# Patient Record
Sex: MALE | Race: ASIAN | Employment: FULL TIME | URBAN - METROPOLITAN AREA
[De-identification: names, ages, dates, MRNs, and addresses within clinical notes are randomized per-mention and may not be internally consistent; named-entity substitution may affect disease eponyms.]

---

## 2017-01-12 ENCOUNTER — TELEPHONE (OUTPATIENT)
Dept: FAMILY MEDICINE CLINIC | Facility: CLINIC | Age: 47
End: 2017-01-12

## 2017-02-21 ENCOUNTER — TELEPHONE (OUTPATIENT)
Dept: FAMILY MEDICINE CLINIC | Facility: CLINIC | Age: 47
End: 2017-02-21

## 2017-02-21 NOTE — TELEPHONE ENCOUNTER
Left message on answering machine for patient to return phone call. Patient is overdue for labs. Will postpone for 1 month. Asked patient to return phone call.

## 2017-03-06 ENCOUNTER — OFFICE VISIT (OUTPATIENT)
Dept: FAMILY MEDICINE CLINIC | Facility: CLINIC | Age: 47
End: 2017-03-06

## 2017-03-06 VITALS
WEIGHT: 202.25 LBS | RESPIRATION RATE: 16 BRPM | OXYGEN SATURATION: 98 % | SYSTOLIC BLOOD PRESSURE: 130 MMHG | TEMPERATURE: 99 F | HEIGHT: 71 IN | DIASTOLIC BLOOD PRESSURE: 82 MMHG | BODY MASS INDEX: 28.31 KG/M2 | HEART RATE: 107 BPM

## 2017-03-06 DIAGNOSIS — E11.9 TYPE 2 DIABETES MELLITUS WITHOUT COMPLICATION, UNSPECIFIED LONG TERM INSULIN USE STATUS: ICD-10-CM

## 2017-03-06 DIAGNOSIS — E78.2 MIXED HYPERLIPIDEMIA: ICD-10-CM

## 2017-03-06 DIAGNOSIS — L40.9 PSORIASIS OF SCALP: ICD-10-CM

## 2017-03-06 DIAGNOSIS — J45.20 MILD INTERMITTENT ASTHMA WITHOUT COMPLICATION: ICD-10-CM

## 2017-03-06 DIAGNOSIS — E78.00 ELEVATED CHOLESTEROL: ICD-10-CM

## 2017-03-06 DIAGNOSIS — Z00.00 HEALTHY ADULT ON ROUTINE PHYSICAL EXAMINATION: Primary | ICD-10-CM

## 2017-03-06 LAB
ALBUMIN SERPL-MCNC: 4.4 G/DL (ref 3.5–4.8)
ALP LIVER SERPL-CCNC: 89 U/L (ref 45–117)
ALT SERPL-CCNC: 63 U/L (ref 17–63)
AST SERPL-CCNC: 27 U/L (ref 15–41)
BILIRUB SERPL-MCNC: 0.7 MG/DL (ref 0.1–2)
BUN BLD-MCNC: 10 MG/DL (ref 8–20)
CALCIUM BLD-MCNC: 9 MG/DL (ref 8.3–10.3)
CHLORIDE: 100 MMOL/L (ref 101–111)
CHOLEST SMN-MCNC: 279 MG/DL (ref ?–200)
CO2: 27 MMOL/L (ref 22–32)
CREAT BLD-MCNC: 1.34 MG/DL (ref 0.7–1.3)
GLUCOSE BLD-MCNC: 154 MG/DL (ref 70–99)
HDLC SERPL-MCNC: 31 MG/DL (ref 45–?)
HDLC SERPL: 9 {RATIO} (ref ?–4.97)
LDLC SERPL CALC-MCNC: 205 MG/DL (ref ?–130)
M PROTEIN MFR SERPL ELPH: 7.9 G/DL (ref 6.1–8.3)
NONHDLC SERPL-MCNC: 248 MG/DL (ref ?–130)
POTASSIUM SERPL-SCNC: 3.6 MMOL/L (ref 3.6–5.1)
SODIUM SERPL-SCNC: 136 MMOL/L (ref 136–144)
TRIGLYCERIDES: 215 MG/DL (ref ?–150)
VLDL: 43 MG/DL (ref 5–40)

## 2017-03-06 PROCEDURE — 80053 COMPREHEN METABOLIC PANEL: CPT | Performed by: FAMILY MEDICINE

## 2017-03-06 PROCEDURE — 82570 ASSAY OF URINE CREATININE: CPT | Performed by: FAMILY MEDICINE

## 2017-03-06 PROCEDURE — 83036 HEMOGLOBIN GLYCOSYLATED A1C: CPT | Performed by: FAMILY MEDICINE

## 2017-03-06 PROCEDURE — 36415 COLL VENOUS BLD VENIPUNCTURE: CPT | Performed by: FAMILY MEDICINE

## 2017-03-06 PROCEDURE — 82043 UR ALBUMIN QUANTITATIVE: CPT | Performed by: FAMILY MEDICINE

## 2017-03-06 PROCEDURE — 80061 LIPID PANEL: CPT | Performed by: FAMILY MEDICINE

## 2017-03-06 PROCEDURE — 99396 PREV VISIT EST AGE 40-64: CPT | Performed by: FAMILY MEDICINE

## 2017-03-06 RX ORDER — GLIPIZIDE 5 MG/1
TABLET, FILM COATED, EXTENDED RELEASE ORAL
Qty: 90 TABLET | Refills: 3 | Status: SHIPPED | OUTPATIENT
Start: 2017-03-06 | End: 2018-04-09

## 2017-03-06 RX ORDER — ATORVASTATIN CALCIUM 40 MG/1
40 TABLET, FILM COATED ORAL NIGHTLY
Qty: 90 TABLET | Refills: 3 | Status: SHIPPED | OUTPATIENT
Start: 2017-03-06 | End: 2017-03-07

## 2017-03-06 RX ORDER — FLUOCINOLONE ACETONIDE 0.11 MG/ML
OIL TOPICAL
Qty: 118 ML | Refills: 0 | Status: SHIPPED | OUTPATIENT
Start: 2017-03-06 | End: 2018-10-08 | Stop reason: ALTCHOICE

## 2017-03-07 ENCOUNTER — TELEPHONE (OUTPATIENT)
Dept: FAMILY MEDICINE CLINIC | Facility: CLINIC | Age: 47
End: 2017-03-07

## 2017-03-07 DIAGNOSIS — E78.2 MIXED HYPERLIPIDEMIA: ICD-10-CM

## 2017-03-07 DIAGNOSIS — E11.65 TYPE 2 DIABETES MELLITUS WITH HYPERGLYCEMIA, WITHOUT LONG-TERM CURRENT USE OF INSULIN (HCC): Primary | ICD-10-CM

## 2017-03-07 LAB
CREAT UR-SCNC: 451 MG/DL
EST. AVERAGE GLUCOSE BLD GHB EST-MCNC: 206 MG/DL (ref 68–126)
HBA1C MFR BLD HPLC: 8.8 % (ref ?–5.7)
MICROALBUMIN UR-MCNC: 4.43 MG/DL
MICROALBUMIN/CREAT 24H UR-RTO: 9.8 UG/MG (ref ?–30)

## 2017-03-07 RX ORDER — ATORVASTATIN CALCIUM 80 MG/1
80 TABLET, FILM COATED ORAL NIGHTLY
Qty: 90 TABLET | Refills: 0 | Status: SHIPPED | OUTPATIENT
Start: 2017-03-07 | End: 2018-10-29

## 2017-03-07 NOTE — PROGRESS NOTES
Kaylah Diaz is a 51year old male who presents for a complete physical exam.   HPI:   Pt complains of nothing. Needs a form filled out ffor work. Diabetes: due for blood work. Checking 1 hr after eating, 140 max. Fasting 105.  Taking everything excep Prescriptions:  FLUOCINOLONE ACETONIDE SCALP 0.01 % External Oil Apply to scalp qd, leave on overnight or >4 hrs before washing Disp: 118 mL Rfl: 0   MetFORMIN HCl 1000 MG Oral Tab Take 1 tablet (1,000 mg total) by mouth 2 (two) times daily with meals.  Dis congestion, sinus pain or ST  LUNGS: denies shortness of breath with exertion  CARDIOVASCULAR: denies chest pain on exertion  GI: denies abdominal pain,denies heartburn, at times, h/o achalasia, worse when he eats too much.    : denies nocturia or changes fat diet, testicular self exam and prostate cancer screening. The patient indicates understanding of these issues and agrees to the plan. The patient is asked to return for CPX in 1 year.     1. Healthy adult on routine physical examination  Fasting blood

## 2017-03-07 NOTE — TELEPHONE ENCOUNTER
Attempted to call home and work numbners, no answer. Cell mail box full, could not leave message. Left message with wife to have pt call the office. Wife called back, Arin Muniz wanted us to speak with her instead since he is at work. Labs are back.  A1

## 2017-03-16 ENCOUNTER — MED REC SCAN ONLY (OUTPATIENT)
Dept: FAMILY MEDICINE CLINIC | Facility: CLINIC | Age: 47
End: 2017-03-16

## 2017-06-21 ENCOUNTER — PATIENT OUTREACH (OUTPATIENT)
Dept: FAMILY MEDICINE CLINIC | Facility: CLINIC | Age: 47
End: 2017-06-21

## 2017-07-11 ENCOUNTER — TELEPHONE (OUTPATIENT)
Dept: FAMILY MEDICINE CLINIC | Facility: CLINIC | Age: 47
End: 2017-07-11

## 2017-07-13 ENCOUNTER — TELEPHONE (OUTPATIENT)
Dept: FAMILY MEDICINE CLINIC | Facility: CLINIC | Age: 47
End: 2017-07-13

## 2017-07-18 NOTE — TELEPHONE ENCOUNTER
He has not had the exam , nor is it scheduled. He's aware of the need, states he will call to schedule.

## 2017-08-11 ENCOUNTER — TELEPHONE (OUTPATIENT)
Dept: FAMILY MEDICINE CLINIC | Facility: CLINIC | Age: 47
End: 2017-08-11

## 2017-12-27 ENCOUNTER — PATIENT OUTREACH (OUTPATIENT)
Dept: FAMILY MEDICINE CLINIC | Facility: CLINIC | Age: 47
End: 2017-12-27

## 2018-03-22 ENCOUNTER — PATIENT OUTREACH (OUTPATIENT)
Dept: FAMILY MEDICINE CLINIC | Facility: CLINIC | Age: 48
End: 2018-03-22

## 2018-03-22 DIAGNOSIS — E11.9 TYPE 2 DIABETES MELLITUS WITHOUT COMPLICATION (HCC): ICD-10-CM

## 2018-03-22 NOTE — TELEPHONE ENCOUNTER
Last refilled on 3/6/17 for # 180 with 3 refills  Last A1C labs 8.8 on 3/6/17. Last seen on 3/6/17. No future appointments. Thank you.

## 2018-03-23 NOTE — TELEPHONE ENCOUNTER
Left message on voicemail/answering machine for patient to call office  Please let patient know script has been sent to pharmacy. Patient also due for f/u with Dr Vijay Gonsales for DM.  (see also patient outreach 3/22/18)

## 2018-04-09 DIAGNOSIS — E11.9 TYPE 2 DIABETES MELLITUS WITHOUT COMPLICATION (HCC): ICD-10-CM

## 2018-04-09 RX ORDER — GLIPIZIDE 5 MG/1
TABLET, FILM COATED, EXTENDED RELEASE ORAL
Qty: 90 TABLET | Refills: 3 | Status: SHIPPED | OUTPATIENT
Start: 2018-04-09 | End: 2019-04-22

## 2018-05-31 ENCOUNTER — PATIENT OUTREACH (OUTPATIENT)
Dept: FAMILY MEDICINE CLINIC | Facility: CLINIC | Age: 48
End: 2018-05-31

## 2018-05-31 NOTE — PROGRESS NOTES
Patient due for diabetic eye exam.    Left message on voicemail/answering machine for patient to call office

## 2018-10-08 ENCOUNTER — OFFICE VISIT (OUTPATIENT)
Dept: FAMILY MEDICINE CLINIC | Facility: CLINIC | Age: 48
End: 2018-10-08
Payer: COMMERCIAL

## 2018-10-08 VITALS
BODY MASS INDEX: 27.77 KG/M2 | HEART RATE: 80 BPM | SYSTOLIC BLOOD PRESSURE: 138 MMHG | WEIGHT: 205 LBS | DIASTOLIC BLOOD PRESSURE: 88 MMHG | RESPIRATION RATE: 16 BRPM | TEMPERATURE: 98 F | HEIGHT: 72 IN

## 2018-10-08 DIAGNOSIS — R06.00 DYSPNEA ON EXERTION: ICD-10-CM

## 2018-10-08 DIAGNOSIS — E11.9 TYPE 2 DIABETES MELLITUS WITHOUT COMPLICATION, UNSPECIFIED WHETHER LONG TERM INSULIN USE (HCC): ICD-10-CM

## 2018-10-08 DIAGNOSIS — E78.00 ELEVATED CHOLESTEROL: Primary | ICD-10-CM

## 2018-10-08 PROCEDURE — 99214 OFFICE O/P EST MOD 30 MIN: CPT | Performed by: FAMILY MEDICINE

## 2018-10-08 PROCEDURE — 84443 ASSAY THYROID STIM HORMONE: CPT | Performed by: FAMILY MEDICINE

## 2018-10-08 PROCEDURE — 93000 ELECTROCARDIOGRAM COMPLETE: CPT | Performed by: FAMILY MEDICINE

## 2018-10-08 PROCEDURE — 85025 COMPLETE CBC W/AUTO DIFF WBC: CPT | Performed by: FAMILY MEDICINE

## 2018-10-08 PROCEDURE — 80053 COMPREHEN METABOLIC PANEL: CPT | Performed by: FAMILY MEDICINE

## 2018-10-08 PROCEDURE — 82570 ASSAY OF URINE CREATININE: CPT | Performed by: FAMILY MEDICINE

## 2018-10-08 PROCEDURE — 36415 COLL VENOUS BLD VENIPUNCTURE: CPT | Performed by: FAMILY MEDICINE

## 2018-10-08 PROCEDURE — 80061 LIPID PANEL: CPT | Performed by: FAMILY MEDICINE

## 2018-10-08 PROCEDURE — 82043 UR ALBUMIN QUANTITATIVE: CPT | Performed by: FAMILY MEDICINE

## 2018-10-08 PROCEDURE — 83036 HEMOGLOBIN GLYCOSYLATED A1C: CPT | Performed by: FAMILY MEDICINE

## 2018-10-08 NOTE — PROGRESS NOTES
HPI:   Janay Reyes is a 51year old male who presents for recheck of his diabetes. Patient’s FBS have been 150's, but not checking regularly. Last visit with ophthalmologist was 3-4 weeks ago at [facility] in [place].   Pt has been checking his feet 01/20/2016     Lab Results   Component Value Date    AST 27 03/06/2017    AST 12 (L) 01/20/2016     Lab Results   Component Value Date    ALT 63 03/06/2017    ALT 33 01/20/2016     Malb/Cre Calc   Date Value Ref Range Status   03/06/2017 9.8 <=30.0 ug/mg F lesions  NECK: supple,no adenopathy  LUNGS: clear to auscultation  CARDIO: RRR without murmur  GI: good BS's,no masses, HSM or tenderness  EXTREMITIES: no cyanosis, clubbing or edema  Bilateral barefoot skin diabetic exam is normal, visualized feet and the

## 2018-10-09 ENCOUNTER — TELEPHONE (OUTPATIENT)
Dept: FAMILY MEDICINE CLINIC | Facility: CLINIC | Age: 48
End: 2018-10-09

## 2018-10-09 NOTE — TELEPHONE ENCOUNTER
Diabetic eye exam done at [facility] by Dr Erika Chambers O.D.   Exam done 9/19/18  No diabetic retinopathy    flowsheet updated  Report sent to scanning

## 2018-10-15 ENCOUNTER — TELEPHONE (OUTPATIENT)
Dept: FAMILY MEDICINE CLINIC | Facility: CLINIC | Age: 48
End: 2018-10-15

## 2018-10-15 DIAGNOSIS — E11.9 TYPE 2 DIABETES MELLITUS WITHOUT COMPLICATION, UNSPECIFIED WHETHER LONG TERM INSULIN USE (HCC): Primary | ICD-10-CM

## 2018-10-15 DIAGNOSIS — E78.00 ELEVATED CHOLESTEROL: ICD-10-CM

## 2018-10-15 RX ORDER — BLOOD SUGAR DIAGNOSTIC
STRIP MISCELLANEOUS
Qty: 300 STRIP | Refills: 3 | Status: SHIPPED | OUTPATIENT
Start: 2018-10-15 | End: 2019-10-15

## 2018-10-15 NOTE — TELEPHONE ENCOUNTER
Pt returned call, discussed results. Restart statin, he has some at home already, will start taking that again. Continue metformin and glipizide but add invokana 100 mg per day, will increase after a month if tolerated to 300 mg per day.    Follow up fo

## 2018-10-29 ENCOUNTER — TELEPHONE (OUTPATIENT)
Dept: FAMILY MEDICINE CLINIC | Facility: CLINIC | Age: 48
End: 2018-10-29

## 2018-10-29 DIAGNOSIS — E11.65 TYPE 2 DIABETES MELLITUS WITH HYPERGLYCEMIA, WITHOUT LONG-TERM CURRENT USE OF INSULIN (HCC): ICD-10-CM

## 2018-10-29 DIAGNOSIS — E78.2 MIXED HYPERLIPIDEMIA: ICD-10-CM

## 2018-10-29 DIAGNOSIS — E78.00 ELEVATED CHOLESTEROL: Primary | ICD-10-CM

## 2018-10-29 RX ORDER — ATORVASTATIN CALCIUM 40 MG/1
40 TABLET, FILM COATED ORAL NIGHTLY
Qty: 90 TABLET | Refills: 3 | Status: SHIPPED | OUTPATIENT
Start: 2018-10-29 | End: 2019-11-17

## 2018-10-29 RX ORDER — ATORVASTATIN CALCIUM 80 MG/1
80 TABLET, FILM COATED ORAL NIGHTLY
Qty: 90 TABLET | Refills: 3 | Status: SHIPPED | OUTPATIENT
Start: 2018-10-29 | End: 2018-10-29 | Stop reason: DRUGHIGH

## 2018-10-29 NOTE — TELEPHONE ENCOUNTER
Pt needs refill atorvastatin, 40 mg, once daily.  (script ) Needs to got [address], [employer] (not [place])

## 2018-10-29 NOTE — TELEPHONE ENCOUNTER
Patient wife calling. States patient last took atorvastatin 40 mg and refill was sent for 80 mg. Wants to know if patient should be taking that dose of atorvastatin    Per epic, atorvastatin 40 mg was prescribed last 7-.   Dose was increased to 80 mg

## 2018-10-29 NOTE — TELEPHONE ENCOUNTER
Last refilled on 3/7/17 for # 90 with 0 refills  Last lipid 10/8/18  Last seen on 10/8/18  Future Appointments   Date Time Provider José Luis Connelly   10/30/2018  2:00 PM Wandy Walker [facility] MICKIE Bowen   11/14/2018  2:45 PM YK CARD TM/STRESS/ECHO

## 2018-10-29 NOTE — TELEPHONE ENCOUNTER
Script sent for 40 mg dose. I think before, I assumed he's been taking it and increased the dose when his numbers were terrible. Lets get him back on the 40 mg dose and see how his lipids look. Thanks.

## 2018-10-30 ENCOUNTER — OFFICE VISIT (OUTPATIENT)
Dept: FAMILY MEDICINE CLINIC | Facility: CLINIC | Age: 48
End: 2018-10-30
Payer: COMMERCIAL

## 2018-10-30 VITALS
HEIGHT: 72 IN | RESPIRATION RATE: 20 BRPM | SYSTOLIC BLOOD PRESSURE: 124 MMHG | DIASTOLIC BLOOD PRESSURE: 60 MMHG | TEMPERATURE: 98 F | WEIGHT: 195 LBS | HEART RATE: 60 BPM | BODY MASS INDEX: 26.41 KG/M2

## 2018-10-30 DIAGNOSIS — I21.19 ACUTE ST ELEVATION MYOCARDIAL INFARCTION (STEMI) INVOLVING OTHER CORONARY ARTERY OF INFERIOR WALL (HCC): Primary | ICD-10-CM

## 2018-10-30 DIAGNOSIS — R11.0 NAUSEA: ICD-10-CM

## 2018-10-30 DIAGNOSIS — Z95.1 S/P CABG X 5: ICD-10-CM

## 2018-10-30 DIAGNOSIS — E11.9 TYPE 2 DIABETES MELLITUS WITHOUT COMPLICATION, WITHOUT LONG-TERM CURRENT USE OF INSULIN (HCC): ICD-10-CM

## 2018-10-30 DIAGNOSIS — I25.10 CORONARY ARTERY DISEASE INVOLVING NATIVE CORONARY ARTERY OF NATIVE HEART WITHOUT ANGINA PECTORIS: ICD-10-CM

## 2018-10-30 PROBLEM — I21.3 ACUTE ST ELEVATION MYOCARDIAL INFARCTION (STEMI) (HCC): Status: ACTIVE | Noted: 2018-10-30

## 2018-10-30 PROBLEM — G47.33 OSA ON CPAP: Status: ACTIVE | Noted: 2018-10-30

## 2018-10-30 PROBLEM — Z99.89 OSA ON CPAP: Status: ACTIVE | Noted: 2018-10-30

## 2018-10-30 PROCEDURE — 99214 OFFICE O/P EST MOD 30 MIN: CPT | Performed by: FAMILY MEDICINE

## 2018-10-30 RX ORDER — HYDROCODONE BITARTRATE AND ACETAMINOPHEN 5; 325 MG/1; MG/1
TABLET ORAL
Refills: 0 | COMMUNITY
Start: 2018-10-23 | End: 2018-11-01

## 2018-10-30 RX ORDER — ASPIRIN 81 MG
TABLET, DELAYED RELEASE (ENTERIC COATED) ORAL
Refills: 0 | COMMUNITY
Start: 2018-10-23 | End: 2019-02-19 | Stop reason: ALTCHOICE

## 2018-10-30 RX ORDER — IBUPROFEN 600 MG/1
600 TABLET ORAL
COMMUNITY
End: 2019-08-19 | Stop reason: ALTCHOICE

## 2018-10-30 RX ORDER — FUROSEMIDE 20 MG/1
TABLET ORAL
Refills: 0 | COMMUNITY
Start: 2018-10-23 | End: 2019-02-19 | Stop reason: ALTCHOICE

## 2018-10-30 RX ORDER — POTASSIUM CHLORIDE 20 MEQ/1
TABLET, EXTENDED RELEASE ORAL
Refills: 0 | COMMUNITY
Start: 2018-10-23 | End: 2019-02-19 | Stop reason: ALTCHOICE

## 2018-10-30 RX ORDER — AMIODARONE HYDROCHLORIDE 200 MG/1
TABLET ORAL
Refills: 0 | COMMUNITY
Start: 2018-10-23 | End: 2019-08-19 | Stop reason: ALTCHOICE

## 2018-10-30 RX ORDER — ONDANSETRON 8 MG/1
8 TABLET, ORALLY DISINTEGRATING ORAL EVERY 8 HOURS PRN
Qty: 15 TABLET | Refills: 0 | Status: SHIPPED | OUTPATIENT
Start: 2018-10-30 | End: 2018-11-17

## 2018-10-30 RX ORDER — OMEPRAZOLE 40 MG/1
CAPSULE, DELAYED RELEASE ORAL
COMMUNITY
Start: 2012-02-28 | End: 2019-08-19 | Stop reason: ALTCHOICE

## 2018-10-30 RX ORDER — ASPIRIN 325 MG
325 TABLET ORAL
COMMUNITY

## 2018-10-30 RX ORDER — LISINOPRIL 5 MG/1
TABLET ORAL
Refills: 0 | COMMUNITY
Start: 2018-10-24 | End: 2019-02-19 | Stop reason: ALTCHOICE

## 2018-10-30 RX ORDER — ACETAMINOPHEN 325 MG/1
650 TABLET ORAL
COMMUNITY
End: 2019-02-19 | Stop reason: ALTCHOICE

## 2018-10-30 NOTE — PROGRESS NOTES
Wero Crowder is a 51year old male. Patient presents with:  Procedure Follow Up: from open heart surgery at [facility]      HPI:   [address] home a week ago s/p CABG x 5. Was in the ER on 10/16/18.  He woke up that AM with sharp chest pain that was differ MG Oral Capsule Delayed Release Take by mouth. Disp:  Rfl:    ondansetron 8 MG Oral Tablet Dispersible Take 1 tablet (8 mg total) by mouth every 8 (eight) hours as needed for Nausea.  Disp: 15 tablet Rfl: 0   atorvastatin 40 MG Oral Tab Take 1 tablet (40 mg Use      Smoking status: Never Smoker      Smokeless tobacco: Never Used    Alcohol use: No      Alcohol/week: 0.0 oz      Comment: occ    Drug use: No       BP Readings from Last 6 Encounters:  10/30/18 : 124/60  10/08/18 : 138/88  03/06/17 : 130/82  12/1 (STEMI) involving other coronary artery of inferior wall (HCC)    Coronary artery disease involving native coronary artery of native heart without angina pectoris    S/P CABG x 5  - follow up with cardiology as scheduled.    - advised to do cardiac rehab wh

## 2018-10-31 ENCOUNTER — TELEPHONE (OUTPATIENT)
Dept: FAMILY MEDICINE CLINIC | Facility: CLINIC | Age: 48
End: 2018-10-31

## 2018-10-31 DIAGNOSIS — Z95.1 S/P CABG X 5: Primary | ICD-10-CM

## 2018-10-31 NOTE — TELEPHONE ENCOUNTER
Pt wants to know if he can get a Refill of the   HYDROcodone-acetaminophen 5-325 MG Oral Tab  He has 4 more left to get his through today, Not sure if he will be ok through the night.      Please return call to [phone]

## 2018-10-31 NOTE — TELEPHONE ENCOUNTER
Wife advised of the information. She would like a few tablets until they can reach the surgeon.  Wife advised she will be contacted once the prescription is ready for

## 2018-10-31 NOTE — TELEPHONE ENCOUNTER
He really needs to be getting that medication from his surgeon so he can be monitoring the pain. Please have them call the surgeon as well.  I can't give them any tonight since I'm not in the office, but I can give them a few tomorrow until they can get a h

## 2018-10-31 NOTE — TELEPHONE ENCOUNTER
Wife called back the office. She states that the original prescription was from when he was released from the hospital. Patient did not mention it yesterday that he would need a refill. He did not release that he was almost out of medication.    Original pr

## 2018-11-01 ENCOUNTER — TELEPHONE (OUTPATIENT)
Dept: FAMILY MEDICINE CLINIC | Facility: CLINIC | Age: 48
End: 2018-11-01

## 2018-11-01 RX ORDER — HYDROCODONE BITARTRATE AND ACETAMINOPHEN 5; 325 MG/1; MG/1
TABLET ORAL
Qty: 10 TABLET | Refills: 0 | Status: SHIPPED | OUTPATIENT
Start: 2018-11-01 | End: 2019-02-19 | Stop reason: ALTCHOICE

## 2018-11-01 NOTE — TELEPHONE ENCOUNTER
Patient wife notified FMLA forms where faxed to [facility].   Copy placed at  per patient wife request.

## 2018-11-01 NOTE — TELEPHONE ENCOUNTER
Spouse called, I told her Dr. Ireland Kinds note from this morning. She has already called the surgeons office this morning. Spouse will come and  script for pt.

## 2018-11-01 NOTE — TELEPHONE ENCOUNTER
Script printed for #10, please advise pt to call surgeon's office today in hopes to get a script by the weekend.

## 2018-11-02 ENCOUNTER — MED REC SCAN ONLY (OUTPATIENT)
Dept: FAMILY MEDICINE CLINIC | Facility: CLINIC | Age: 48
End: 2018-11-02

## 2018-11-07 ENCOUNTER — TELEPHONE (OUTPATIENT)
Dept: FAMILY MEDICINE CLINIC | Facility: CLINIC | Age: 48
End: 2018-11-07

## 2018-11-07 NOTE — TELEPHONE ENCOUNTER
Dr. Eryn Fatima, pt has a overdue test, does patient still need to complete or can we cancel?      Lab Frequency Next Occurrence   CARD ECHO STRESS ECHO/REST AND STRESS(CPT=93350/309[address] [facility] [place] [address]) Once 10/08/2018

## 2018-11-17 DIAGNOSIS — R11.0 NAUSEA: ICD-10-CM

## 2018-11-17 RX ORDER — ONDANSETRON 8 MG/1
8 TABLET, ORALLY DISINTEGRATING ORAL EVERY 8 HOURS PRN
Qty: 30 TABLET | Refills: 0 | Status: SHIPPED | OUTPATIENT
Start: 2018-11-17 | End: 2019-02-19 | Stop reason: ALTCHOICE

## 2018-11-20 ENCOUNTER — TELEPHONE (OUTPATIENT)
Dept: FAMILY MEDICINE CLINIC | Facility: CLINIC | Age: 48
End: 2018-11-20

## 2018-11-20 NOTE — TELEPHONE ENCOUNTER
[address] CALLED AND NEEDS A VERBAL FROM DR TO DISCHARGES HOME HEALTH ORDERS.     WAS ADV PT IS NOT HOME BOUND

## 2018-11-20 NOTE — TELEPHONE ENCOUNTER
BOLIVAR FROM [facility] CALLED BACK-RELAYED PROVIDING INFORMATION THAT DR WILLSON PROVIDED.     ADV TO CONTACT SURGEON    BOLIVAR V/U

## 2018-11-20 NOTE — TELEPHONE ENCOUNTER
Per MARTITA, surgeon ordered home health. Will need to contact the ordering provider to discharge. Voicemail did not state name of who I was calling. Left message that I was returning a call to [facility].   No patient info provided in message

## 2018-12-14 ENCOUNTER — OFFICE VISIT (OUTPATIENT)
Dept: FAMILY MEDICINE CLINIC | Facility: CLINIC | Age: 48
End: 2018-12-14
Payer: COMMERCIAL

## 2018-12-14 VITALS
TEMPERATURE: 97 F | BODY MASS INDEX: 26.66 KG/M2 | RESPIRATION RATE: 16 BRPM | DIASTOLIC BLOOD PRESSURE: 84 MMHG | WEIGHT: 199 LBS | SYSTOLIC BLOOD PRESSURE: 124 MMHG | HEIGHT: 72.5 IN | HEART RATE: 64 BPM

## 2018-12-14 DIAGNOSIS — E11.9 TYPE 2 DIABETES MELLITUS WITHOUT COMPLICATION, WITHOUT LONG-TERM CURRENT USE OF INSULIN (HCC): ICD-10-CM

## 2018-12-14 DIAGNOSIS — Z95.1 S/P CABG X 5: Primary | ICD-10-CM

## 2018-12-14 DIAGNOSIS — T81.89XA DELAYED SURGICAL WOUND HEALING, INITIAL ENCOUNTER: ICD-10-CM

## 2018-12-14 DIAGNOSIS — L03.116 CELLULITIS OF LEFT LOWER EXTREMITY: ICD-10-CM

## 2018-12-14 PROCEDURE — 99214 OFFICE O/P EST MOD 30 MIN: CPT | Performed by: FAMILY MEDICINE

## 2018-12-14 NOTE — PROGRESS NOTES
John Morrell is a 51year old male. Patient presents with: Follow - Up: F/U on open heart surgery. needs paper to exend leave. HPI:   Was still having discharge from the wound this week. Has cleard up some.  Saw Dr. Sanna Rivera yesterday and he thoug Rfl: 0   Potassium Chloride ER 20 MEQ Oral Tab CR TK 1 T PO QD Disp:  Rfl: 0   Omeprazole 40 MG Oral Capsule Delayed Release Take by mouth.  Disp:  Rfl:    SENNA PLUS 8.6-50 MG Oral Tab TK 1 T PO BID Disp:  Rfl: 0   atorvastatin 40 MG Oral Tab Take 1 tablet CARDIOVASCULAR: chest pain from sternotomy, no palpitations or dizziness   GI: denies abdominal pain and denies heartburn  NEURO: denies headaches or dizziness     EXAM:   /84 (BP Location: Left arm, Patient Position: Sitting, Cuff Size: adult)   P in this encounter. Meds & Refills for this Visit:  Requested Prescriptions      No prescriptions requested or ordered in this encounter             The patient indicates understanding of these issues and agrees to the plan.

## 2018-12-20 ENCOUNTER — TELEPHONE (OUTPATIENT)
Dept: FAMILY MEDICINE CLINIC | Facility: CLINIC | Age: 48
End: 2018-12-20

## 2018-12-20 ENCOUNTER — MED REC SCAN ONLY (OUTPATIENT)
Dept: FAMILY MEDICINE CLINIC | Facility: CLINIC | Age: 48
End: 2018-12-20

## 2019-01-09 ENCOUNTER — TELEPHONE (OUTPATIENT)
Dept: FAMILY MEDICINE CLINIC | Facility: CLINIC | Age: 49
End: 2019-01-09

## 2019-01-09 NOTE — TELEPHONE ENCOUNTER
Pt's wife would like to nurse. Didn't want to give any details.    Please return call to [phone]

## 2019-01-09 NOTE — TELEPHONE ENCOUNTER
Call from patient's wife. [facility] [place] [facility] had MI and heart surgery in October 2018. [facility] [place] [facility] patient has had his FMLA extended due to some complications that he had, and is still at home.  Wife Onur Masterson states that she has been his caregiver 24/7 during this time

## 2019-01-15 ENCOUNTER — TELEPHONE (OUTPATIENT)
Dept: FAMILY MEDICINE CLINIC | Facility: CLINIC | Age: 49
End: 2019-01-15

## 2019-01-15 NOTE — TELEPHONE ENCOUNTER
Letter mailed to patient reminding him he is due for labs.     Lab Frequency Next Occurrence   HEMOGLOBIN A1C Once 01/15/2019   LIPID PANEL Once 01/15/2019

## 2019-01-28 ENCOUNTER — TELEPHONE (OUTPATIENT)
Dept: FAMILY MEDICINE CLINIC | Facility: CLINIC | Age: 49
End: 2019-01-28

## 2019-01-28 NOTE — TELEPHONE ENCOUNTER
Received notes from [facility] cardio/pulm rehab. Dr Juan Cody review and faxed back to [place] at [phone]. Copy sent to scanning.

## 2019-02-05 ENCOUNTER — NURSE ONLY (OUTPATIENT)
Dept: FAMILY MEDICINE CLINIC | Facility: CLINIC | Age: 49
End: 2019-02-05
Payer: COMMERCIAL

## 2019-02-05 DIAGNOSIS — E78.00 ELEVATED CHOLESTEROL: ICD-10-CM

## 2019-02-05 DIAGNOSIS — E11.9 TYPE 2 DIABETES MELLITUS WITHOUT COMPLICATION, UNSPECIFIED WHETHER LONG TERM INSULIN USE (HCC): ICD-10-CM

## 2019-02-05 LAB
CHOLEST SMN-MCNC: 165 MG/DL (ref ?–200)
EST. AVERAGE GLUCOSE BLD GHB EST-MCNC: 151 MG/DL (ref 68–126)
HBA1C MFR BLD HPLC: 6.9 % (ref ?–5.7)
HDLC SERPL-MCNC: 31 MG/DL (ref 40–59)
LDLC SERPL CALC-MCNC: 114 MG/DL (ref ?–100)
NONHDLC SERPL-MCNC: 134 MG/DL (ref ?–130)
TRIGL SERPL-MCNC: 98 MG/DL (ref 30–149)
VLDLC SERPL CALC-MCNC: 20 MG/DL (ref 0–30)

## 2019-02-05 PROCEDURE — 36415 COLL VENOUS BLD VENIPUNCTURE: CPT | Performed by: FAMILY MEDICINE

## 2019-02-05 PROCEDURE — 80061 LIPID PANEL: CPT | Performed by: FAMILY MEDICINE

## 2019-02-05 PROCEDURE — 83036 HEMOGLOBIN GLYCOSYLATED A1C: CPT | Performed by: FAMILY MEDICINE

## 2019-02-06 ENCOUNTER — TELEPHONE (OUTPATIENT)
Dept: FAMILY MEDICINE CLINIC | Facility: CLINIC | Age: 49
End: 2019-02-06

## 2019-02-06 DIAGNOSIS — E11.9 TYPE 2 DIABETES MELLITUS WITHOUT COMPLICATION, WITHOUT LONG-TERM CURRENT USE OF INSULIN (HCC): Primary | ICD-10-CM

## 2019-02-06 NOTE — TELEPHONE ENCOUNTER
Great, I'm glad he is doing well. I do need to see him in the office for a check-in before clearing him to go back to work. I'm assuming he'll need a letter from me to go back to work.

## 2019-02-06 NOTE — TELEPHONE ENCOUNTER
Pt was advised of results- verbalized understanding    Has been in rehab for 4 weeks and is going well- slated to go back off disability on 2/25    Future orders placed with recall

## 2019-02-06 NOTE — TELEPHONE ENCOUNTER
----- Message from Gautam Lin DO sent at 2/6/2019 12:40 PM CST -----  Pls let pt know that his A1c is better than it's been in the past couple of years. We are down to 6.9, which is great. Cholesterol is also MUCH better than it was.  Keep up with curr

## 2019-02-15 ENCOUNTER — TELEPHONE (OUTPATIENT)
Dept: FAMILY MEDICINE CLINIC | Facility: CLINIC | Age: 49
End: 2019-02-15

## 2019-02-15 NOTE — TELEPHONE ENCOUNTER
Disability paperwork rec'd. I need to see him in the office to clear him to go back to work. Can you schedule him to see me next week so we can get him back the following week as he had said he was going to do.

## 2019-02-19 ENCOUNTER — OFFICE VISIT (OUTPATIENT)
Dept: FAMILY MEDICINE CLINIC | Facility: CLINIC | Age: 49
End: 2019-02-19
Payer: COMMERCIAL

## 2019-02-19 VITALS
TEMPERATURE: 97 F | RESPIRATION RATE: 16 BRPM | SYSTOLIC BLOOD PRESSURE: 120 MMHG | BODY MASS INDEX: 27.17 KG/M2 | WEIGHT: 205 LBS | HEIGHT: 73 IN | HEART RATE: 62 BPM | DIASTOLIC BLOOD PRESSURE: 90 MMHG

## 2019-02-19 DIAGNOSIS — I21.19 ACUTE ST ELEVATION MYOCARDIAL INFARCTION (STEMI) INVOLVING OTHER CORONARY ARTERY OF INFERIOR WALL (HCC): Primary | ICD-10-CM

## 2019-02-19 DIAGNOSIS — E11.9 TYPE 2 DIABETES MELLITUS WITHOUT COMPLICATION, WITHOUT LONG-TERM CURRENT USE OF INSULIN (HCC): ICD-10-CM

## 2019-02-19 DIAGNOSIS — Z99.89 OSA ON CPAP: ICD-10-CM

## 2019-02-19 DIAGNOSIS — Z95.1 S/P CABG X 5: ICD-10-CM

## 2019-02-19 DIAGNOSIS — G47.33 OSA ON CPAP: ICD-10-CM

## 2019-02-19 DIAGNOSIS — E78.00 ELEVATED CHOLESTEROL: ICD-10-CM

## 2019-02-19 PROCEDURE — 99214 OFFICE O/P EST MOD 30 MIN: CPT | Performed by: FAMILY MEDICINE

## 2019-02-19 RX ORDER — LOSARTAN POTASSIUM 25 MG/1
TABLET ORAL
Refills: 3 | COMMUNITY
Start: 2019-02-02

## 2019-02-19 NOTE — PROGRESS NOTES
Michelle Jeffrey is a 51year old male. Patient presents with: Follow - Up: needs clearance to go back to work      HPI:   Here to be cleared to go back to work. 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  He has felt better being off and away from t Essential hypertension       Social History:  Social History    Tobacco Use      Smoking status: Never Smoker      Smokeless tobacco: Never Used    Alcohol use: No      Alcohol/week: 0.0 oz      Comment: occ    Drug use: No       BP Readings from Last 6 En orders for this visit:    Acute ST elevation myocardial infarction (STEMI) involving other coronary artery of inferior wall [facility])  - doing well with cardiac rehab, which is increasing his confidence about going back to work.    - he feels ready to go back t

## 2019-02-25 ENCOUNTER — TELEPHONE (OUTPATIENT)
Dept: FAMILY MEDICINE CLINIC | Facility: CLINIC | Age: 49
End: 2019-02-25

## 2019-02-25 NOTE — TELEPHONE ENCOUNTER
PT WOULD LIKE TO DISCUSS HIS CONDITION UPDATE.  PT MET WITH CARDIOLOGIST    PLEASE CALL     THANK YOU

## 2019-02-25 NOTE — TELEPHONE ENCOUNTER
Patient states he met with cardiology and was approved to return to work part time for the next 4 weeks. States that will not work financially for him and would like to return full time.  Advised patient Dr Elidia Lee will not go against cardiology recommendati

## 2019-02-26 ENCOUNTER — MED REC SCAN ONLY (OUTPATIENT)
Dept: FAMILY MEDICINE CLINIC | Facility: CLINIC | Age: 49
End: 2019-02-26

## 2019-02-26 NOTE — TELEPHONE ENCOUNTER
Patient notified forms have been filled out per cardiology recommendation for 4 hours per day x 5 days per week x 4 weeks. Patient verbalized understanding.

## 2019-04-03 DIAGNOSIS — E11.9 TYPE 2 DIABETES MELLITUS WITHOUT COMPLICATION (HCC): ICD-10-CM

## 2019-04-03 NOTE — TELEPHONE ENCOUNTER
Last refilled on 3/22/18 for # 180 with 3 refills  Last A1C 6.9 on 2/5/19  Last OV 2/19/19  No future appointments. Thank you.

## 2019-04-22 DIAGNOSIS — E11.9 TYPE 2 DIABETES MELLITUS WITHOUT COMPLICATION (HCC): ICD-10-CM

## 2019-04-22 RX ORDER — GLIPIZIDE 5 MG/1
TABLET, FILM COATED, EXTENDED RELEASE ORAL
Qty: 90 TABLET | Refills: 0 | Status: SHIPPED | OUTPATIENT
Start: 2019-04-22 | End: 2019-07-25

## 2019-04-22 NOTE — TELEPHONE ENCOUNTER
Last refilled on 4/9/18 for # 90 with 3 refills  Last A1C 6.9 on 2/5/19  Last OV 2/19/19  No future appointments. Thank you.

## 2019-05-11 ENCOUNTER — NURSE ONLY (OUTPATIENT)
Dept: FAMILY MEDICINE CLINIC | Facility: CLINIC | Age: 49
End: 2019-05-11
Payer: COMMERCIAL

## 2019-05-11 DIAGNOSIS — E11.9 TYPE 2 DIABETES MELLITUS WITHOUT COMPLICATION, WITHOUT LONG-TERM CURRENT USE OF INSULIN (HCC): ICD-10-CM

## 2019-05-11 PROCEDURE — 36415 COLL VENOUS BLD VENIPUNCTURE: CPT | Performed by: FAMILY MEDICINE

## 2019-05-11 PROCEDURE — 83036 HEMOGLOBIN GLYCOSYLATED A1C: CPT | Performed by: FAMILY MEDICINE

## 2019-05-11 NOTE — PROGRESS NOTES
Patient presented for lab work ordered by Dr. Eryn Fatima. One lavender tube drawn with a straight needle in left AC space x 1 successful attempt. Pt tolerated procedure well and left in stable condition.

## 2019-05-13 DIAGNOSIS — Z79.4 TYPE 2 DIABETES MELLITUS WITH MICROALBUMINURIA, WITH LONG-TERM CURRENT USE OF INSULIN (HCC): Primary | ICD-10-CM

## 2019-05-13 DIAGNOSIS — E11.29 TYPE 2 DIABETES MELLITUS WITH MICROALBUMINURIA, WITH LONG-TERM CURRENT USE OF INSULIN (HCC): Primary | ICD-10-CM

## 2019-05-13 DIAGNOSIS — R80.9 TYPE 2 DIABETES MELLITUS WITH MICROALBUMINURIA, WITH LONG-TERM CURRENT USE OF INSULIN (HCC): Primary | ICD-10-CM

## 2019-06-10 DIAGNOSIS — E11.9 TYPE 2 DIABETES MELLITUS WITHOUT COMPLICATION (HCC): ICD-10-CM

## 2019-07-25 DIAGNOSIS — E11.9 TYPE 2 DIABETES MELLITUS WITHOUT COMPLICATION (HCC): ICD-10-CM

## 2019-07-25 RX ORDER — GLIPIZIDE 5 MG/1
TABLET, FILM COATED, EXTENDED RELEASE ORAL
Qty: 90 TABLET | Refills: 0 | Status: SHIPPED | OUTPATIENT
Start: 2019-07-25 | End: 2019-10-03

## 2019-07-25 NOTE — TELEPHONE ENCOUNTER
Fax from [facility] requesting refill of Glipizide    Last OV 2/19/19  Last lab 5/11/19  Last refilled 4/22/19  #90  0 refills

## 2019-08-01 ENCOUNTER — TELEPHONE (OUTPATIENT)
Dept: FAMILY MEDICINE CLINIC | Facility: CLINIC | Age: 49
End: 2019-08-01

## 2019-08-01 DIAGNOSIS — M54.2 NECK PAIN: Primary | ICD-10-CM

## 2019-08-01 NOTE — TELEPHONE ENCOUNTER
He can see Carl Deter, or otherwise whoever is in-network for him. They should call insurance and see if someone is preferred.

## 2019-08-01 NOTE — TELEPHONE ENCOUNTER
Patient has been having neck pain due to computer.   Would like to see a chiropractor and is looking for recommendation

## 2019-08-03 DIAGNOSIS — E11.9 TYPE 2 DIABETES MELLITUS WITHOUT COMPLICATION (HCC): ICD-10-CM

## 2019-08-05 NOTE — TELEPHONE ENCOUNTER
Last refilled on 6/10/19 for # 120 with 0 refills  Last a1c 7.2 on 5/11/19  Last OV 2/19/19  Future Appointments   Date Time Provider José Luis Connelly   8/19/2019  6:00 PM Del Gil [facility] [place] 2[facility] MICKIE Houston        Thank you.

## 2019-08-19 ENCOUNTER — OFFICE VISIT (OUTPATIENT)
Dept: FAMILY MEDICINE CLINIC | Facility: CLINIC | Age: 49
End: 2019-08-19
Payer: COMMERCIAL

## 2019-08-19 VITALS
TEMPERATURE: 97 F | WEIGHT: 212 LBS | HEART RATE: 72 BPM | RESPIRATION RATE: 16 BRPM | HEIGHT: 71.25 IN | SYSTOLIC BLOOD PRESSURE: 94 MMHG | BODY MASS INDEX: 29.35 KG/M2 | DIASTOLIC BLOOD PRESSURE: 60 MMHG

## 2019-08-19 DIAGNOSIS — Z00.00 HEALTHY ADULT ON ROUTINE PHYSICAL EXAMINATION: ICD-10-CM

## 2019-08-19 DIAGNOSIS — R39.9 LOWER URINARY TRACT SYMPTOMS (LUTS): ICD-10-CM

## 2019-08-19 DIAGNOSIS — E11.9 TYPE 2 DIABETES MELLITUS WITHOUT COMPLICATION (HCC): ICD-10-CM

## 2019-08-19 DIAGNOSIS — E11.9 TYPE 2 DIABETES MELLITUS WITHOUT COMPLICATION, WITHOUT LONG-TERM CURRENT USE OF INSULIN (HCC): Primary | ICD-10-CM

## 2019-08-19 DIAGNOSIS — Z12.5 SCREENING FOR PROSTATE CANCER: ICD-10-CM

## 2019-08-19 PROBLEM — Z98.61 POST PTCA: Status: ACTIVE | Noted: 2018-11-16

## 2019-08-19 PROCEDURE — 99396 PREV VISIT EST AGE 40-64: CPT | Performed by: FAMILY MEDICINE

## 2019-08-19 NOTE — PROGRESS NOTES
Gentry Olsen is a 47year old male who presents for a complete physical exam.   HPI:   Pt complains of nothing new today. Has had a lot of stress at work lately. Cardio: doing well. Healing, doing some more activities.  Last visit changed some tablet Rfl: 0   glipiZIDE ER 5 MG Oral Tablet 24 Hr TAKE ONE TABLET BY MOUTH ONCE DAILY Disp: 90 tablet Rfl: 0   Losartan Potassium 25 MG Oral Tab TK 1 T PO D Disp:  Rfl: 3   aspirin 325 MG Oral Tab Take 325 mg by mouth.  Disp:  Rfl:    metoprolol Tartrate carb diet     REVIEW OF SYSTEMS:   GENERAL: feels well otherwise  SKIN: denies any unusual skin lesions  EYES:denies blurred vision or double vision  HEENT: denies nasal congestion, sinus pain or ST  LUNGS: denies shortness of breath with exertion  CARDIOV in 1 year or sooner if needed.     Type 2 diabetes mellitus without complication, without long-term current use of insulin (hcc)  (primary encounter diagnosis)  Healthy adult on routine physical examination  Screening for prostate cancer  Lower urinary trac

## 2019-09-18 ENCOUNTER — TELEPHONE (OUTPATIENT)
Dept: FAMILY MEDICINE CLINIC | Facility: CLINIC | Age: 49
End: 2019-09-18

## 2019-10-01 ENCOUNTER — TELEPHONE (OUTPATIENT)
Dept: FAMILY MEDICINE CLINIC | Facility: CLINIC | Age: 49
End: 2019-10-01

## 2019-10-01 DIAGNOSIS — E11.9 TYPE 2 DIABETES MELLITUS WITHOUT COMPLICATION, WITHOUT LONG-TERM CURRENT USE OF INSULIN (HCC): Primary | ICD-10-CM

## 2019-10-01 DIAGNOSIS — E11.9 TYPE 2 DIABETES MELLITUS WITHOUT COMPLICATION (HCC): ICD-10-CM

## 2019-10-01 NOTE — TELEPHONE ENCOUNTER
Notes recorded by Dawn Webb DO on 10/1/2019 at 4:10 PM CDT  Pls let pt know that his A1c is still a little high at 7.4. It is creeping up from last time. Cholesterol is a little higher than I like to see in a diabetic.  All other labs look good inclu

## 2019-10-03 RX ORDER — GLIPIZIDE 5 MG/1
5 TABLET, FILM COATED, EXTENDED RELEASE ORAL 2 TIMES DAILY
Qty: 180 TABLET | Refills: 0 | Status: SHIPPED | OUTPATIENT
Start: 2019-10-03 | End: 2020-02-18

## 2019-10-03 NOTE — TELEPHONE ENCOUNTER
Patient notified and verbalized understanding. Will f/u with cardiology    Requests script to [facility] in [place].   Recall in epic

## 2019-11-17 DIAGNOSIS — E78.00 ELEVATED CHOLESTEROL: ICD-10-CM

## 2019-11-18 RX ORDER — ATORVASTATIN CALCIUM 40 MG/1
TABLET, FILM COATED ORAL
Qty: 90 TABLET | Refills: 3 | Status: SHIPPED | OUTPATIENT
Start: 2019-11-18 | End: 2021-08-30

## 2019-11-18 NOTE — TELEPHONE ENCOUNTER
Last refilled on 10/29/18 for # 90 with 3 rf. Last labs 9/30/19. Last seen on 8/19/19. No future appointments. Thank you.

## 2019-12-08 DIAGNOSIS — E11.9 TYPE 2 DIABETES MELLITUS WITHOUT COMPLICATION (HCC): ICD-10-CM

## 2019-12-09 NOTE — TELEPHONE ENCOUNTER
Last refill: 8- #180 with 0 refills  Last Visit: 8-  Next Visit: No future appointments. Forward to Dr. Lisandro Panchal please advise on refills. Thanks.

## 2019-12-10 ENCOUNTER — MED REC SCAN ONLY (OUTPATIENT)
Dept: FAMILY MEDICINE CLINIC | Facility: CLINIC | Age: 49
End: 2019-12-10

## 2020-01-06 ENCOUNTER — TELEPHONE (OUTPATIENT)
Dept: FAMILY MEDICINE CLINIC | Facility: CLINIC | Age: 50
End: 2020-01-06

## 2020-01-06 NOTE — TELEPHONE ENCOUNTER
Pt is due for labs. Letter mailed to patient reminding him to have labs done at [facility]. Order sent with letter to patient.

## 2020-01-20 ENCOUNTER — TELEPHONE (OUTPATIENT)
Dept: FAMILY MEDICINE CLINIC | Facility: CLINIC | Age: 50
End: 2020-01-20

## 2020-02-08 ENCOUNTER — TELEPHONE (OUTPATIENT)
Dept: FAMILY MEDICINE CLINIC | Facility: CLINIC | Age: 50
End: 2020-02-08

## 2020-02-18 DIAGNOSIS — E11.9 TYPE 2 DIABETES MELLITUS WITHOUT COMPLICATION (HCC): ICD-10-CM

## 2020-02-18 RX ORDER — GLIPIZIDE 5 MG/1
TABLET, FILM COATED, EXTENDED RELEASE ORAL
Qty: 180 TABLET | Refills: 0 | Status: SHIPPED | OUTPATIENT
Start: 2020-02-18 | End: 2020-05-21

## 2020-02-18 NOTE — TELEPHONE ENCOUNTER
Diabetic Medication Protocol Failed2/18 3:33 AM   Appointment in past 6 or next 3 months    HgBA1C procedure resulted in past 6 months    Last HgBA1C < 7.5    Microalbumin procedure in past 12 months or taking ACE/ARB     Last OV 8/19/2019  Last labs 9/30/

## 2020-04-24 ENCOUNTER — TELEPHONE (OUTPATIENT)
Dept: FAMILY MEDICINE CLINIC | Facility: CLINIC | Age: 50
End: 2020-04-24

## 2020-04-24 NOTE — TELEPHONE ENCOUNTER
Please schedule a video/telephone visit for med f/u for DM and instruct her to have at least 5 days of fasting and 2 hour after largest meal of day blood sugars.   If she does not have a glucometer, please have her check with insurance to see what is covere

## 2020-05-09 DIAGNOSIS — E11.9 TYPE 2 DIABETES MELLITUS WITHOUT COMPLICATION (HCC): ICD-10-CM

## 2020-05-11 NOTE — TELEPHONE ENCOUNTER
Patient notified via detailed voicemail left at cell number (ok per  HIPAA consent)  Asked to call back to schedule appt

## 2020-05-11 NOTE — TELEPHONE ENCOUNTER
Diabetic Medication Protocol Failed5/9 3:33 AM   HgBA1C procedure resulted in past 6 months    Last HgBA1C < 7.5    Appointment in past 6 or next 3 months    Microalbumin procedure in past 12 months or taking ACE/ARB     Last refill - 2/18/20 - #180   Last

## 2020-05-11 NOTE — TELEPHONE ENCOUNTER
Please schedule video DM follow up. We can bring him in for labs later. I can send the script if he needs it before then.

## 2020-05-21 RX ORDER — GLIPIZIDE 5 MG/1
5 TABLET, FILM COATED, EXTENDED RELEASE ORAL 2 TIMES DAILY
Qty: 60 TABLET | Refills: 0 | Status: SHIPPED | OUTPATIENT
Start: 2020-05-21 | End: 2020-06-22

## 2020-06-02 DIAGNOSIS — E11.9 TYPE 2 DIABETES MELLITUS WITHOUT COMPLICATION (HCC): ICD-10-CM

## 2020-06-04 NOTE — TELEPHONE ENCOUNTER
Patient notified via detailed voicemail left at cell number (ok per  HIPAA consent)    Asked patient to call back to schedule in office appointment.  Med will be sent once scheduled

## 2020-06-05 NOTE — TELEPHONE ENCOUNTER
Can only come after 530 or on a Saturday.       Future Appointments   Date Time Provider José Luis Connelly   6/27/2020 10:30 AM DO SANTY Serrano      routed to  to refill

## 2020-06-22 DIAGNOSIS — E11.9 TYPE 2 DIABETES MELLITUS WITHOUT COMPLICATION (HCC): ICD-10-CM

## 2020-06-22 RX ORDER — GLIPIZIDE 5 MG/1
TABLET, FILM COATED, EXTENDED RELEASE ORAL
Qty: 60 TABLET | Refills: 0 | Status: SHIPPED | OUTPATIENT
Start: 2020-06-22 | End: 2020-10-05

## 2020-06-27 ENCOUNTER — OFFICE VISIT (OUTPATIENT)
Dept: FAMILY MEDICINE CLINIC | Facility: CLINIC | Age: 50
End: 2020-06-27
Payer: COMMERCIAL

## 2020-06-27 VITALS
BODY MASS INDEX: 29 KG/M2 | TEMPERATURE: 98 F | SYSTOLIC BLOOD PRESSURE: 110 MMHG | DIASTOLIC BLOOD PRESSURE: 80 MMHG | WEIGHT: 215.19 LBS | HEART RATE: 69 BPM

## 2020-06-27 DIAGNOSIS — E78.00 ELEVATED CHOLESTEROL: Primary | ICD-10-CM

## 2020-06-27 DIAGNOSIS — E11.9 TYPE 2 DIABETES MELLITUS WITHOUT COMPLICATION, WITHOUT LONG-TERM CURRENT USE OF INSULIN (HCC): ICD-10-CM

## 2020-06-27 DIAGNOSIS — I25.10 CORONARY ARTERY DISEASE INVOLVING NATIVE CORONARY ARTERY OF NATIVE HEART WITHOUT ANGINA PECTORIS: ICD-10-CM

## 2020-06-27 PROCEDURE — 99214 OFFICE O/P EST MOD 30 MIN: CPT | Performed by: FAMILY MEDICINE

## 2020-06-27 PROCEDURE — 80053 COMPREHEN METABOLIC PANEL: CPT | Performed by: FAMILY MEDICINE

## 2020-06-27 PROCEDURE — 85025 COMPLETE CBC W/AUTO DIFF WBC: CPT | Performed by: FAMILY MEDICINE

## 2020-06-27 PROCEDURE — 80061 LIPID PANEL: CPT | Performed by: FAMILY MEDICINE

## 2020-06-27 PROCEDURE — 83036 HEMOGLOBIN GLYCOSYLATED A1C: CPT | Performed by: FAMILY MEDICINE

## 2020-06-27 PROCEDURE — 82043 UR ALBUMIN QUANTITATIVE: CPT | Performed by: FAMILY MEDICINE

## 2020-06-27 PROCEDURE — 82570 ASSAY OF URINE CREATININE: CPT | Performed by: FAMILY MEDICINE

## 2020-06-27 NOTE — PROGRESS NOTES
HPI:   Winsome White is a 45year old male who presents for recheck of his diabetes. Patient’s FBS have been 130-150's. Last visit with ophthalmologist was at the end of last year. Pt has been checking his feet on a regular basis.  Pt denies any tingl 1 TABLET BY MOUTH TWICE DAILY 60 tablet 0   • METFORMIN HCL 1000 MG Oral Tab TAKE 1 TABLET BY MOUTH TWICE DAILY WITH FOOD 180 tablet 0   • ATORVASTATIN 40 MG Oral Tab TAKE 1 TABLET(40 MG) BY MOUTH EVERY NIGHT 90 tablet 3   • Losartan Potassium 25 MG Oral T lesions  NECK: supple,no adenopathy,no bruits  LUNGS: clear to auscultation  CARDIO: RRR without murmur  GI: good BS's,no masses, HSM or tenderness  EXTREMITIES: no cyanosis, clubbing or edema  Bilateral barefoot skin diabetic exam is normal, visualized fe

## 2020-06-29 ENCOUNTER — TELEPHONE (OUTPATIENT)
Dept: FAMILY MEDICINE CLINIC | Facility: CLINIC | Age: 50
End: 2020-06-29

## 2020-06-29 DIAGNOSIS — E11.9 TYPE 2 DIABETES MELLITUS WITHOUT COMPLICATION (HCC): ICD-10-CM

## 2020-06-29 RX ORDER — METOPROLOL SUCCINATE 25 MG/1
TABLET, EXTENDED RELEASE ORAL
COMMUNITY
Start: 2020-05-27 | End: 2020-10-05

## 2020-06-29 RX ORDER — GLIPIZIDE 10 MG/1
10 TABLET, FILM COATED, EXTENDED RELEASE ORAL 2 TIMES DAILY
Qty: 180 TABLET | Refills: 0 | Status: CANCELLED | OUTPATIENT
Start: 2020-06-29

## 2020-06-29 NOTE — TELEPHONE ENCOUNTER
You can let him know that his A1c is up to 8.2 and his cholesterol is higher than I would like to see in a diabetic, but I will defer to cardiology to adjust the cholesterol meds.    Other labs look fine including urine test for protein, blood cell counts a

## 2020-06-29 NOTE — TELEPHONE ENCOUNTER
Patient wife notified and verbalized understanding. a1c and lipid values provided    She will discuss with patient and call back if he is agreeable to increase dose of glipizide. (uses [facility] in [place])  Will contact cardiology regarding li

## 2020-07-06 ENCOUNTER — TELEPHONE (OUTPATIENT)
Dept: FAMILY MEDICINE CLINIC | Facility: CLINIC | Age: 50
End: 2020-07-06

## 2020-07-06 NOTE — TELEPHONE ENCOUNTER
METFORMIN HCL 1000 MG Oral Tab      Pt would like refill sent to   [facility] [address], [address] [address] AT [address], [phone], [phone]

## 2020-07-06 NOTE — TELEPHONE ENCOUNTER
----- Message from Cordelia Lozano sent at 7/6/2020  3:44 PM CDT -----  Pham Munoz returned call in regards to PT.  Please call back, thanks

## 2020-07-20 NOTE — TELEPHONE ENCOUNTER
Spoke with Nikolay Salas who states patient did not want to increase his glipizide. States he has some episodes of hypoglycemia on current dose and had concerns about increasing  States episodes are not frequent but has had a few.     Will work on diet and f/u

## 2020-08-05 ENCOUNTER — TELEPHONE (OUTPATIENT)
Dept: FAMILY MEDICINE CLINIC | Facility: CLINIC | Age: 50
End: 2020-08-05

## 2020-08-05 NOTE — TELEPHONE ENCOUNTER
Spouse called and wants to know if we changed the ATORVASTATIN 40 MG Oral Tab. They picked script up it was 80MG. Pt doesn't remember a change and asked spouse to call on it.

## 2020-08-05 NOTE — TELEPHONE ENCOUNTER
Phone call to wife - advised her of the information from 's note from June 29 2020. She is going to check the name on the bottle of medication and see who the prescription if from and call back the office.

## 2020-09-25 ENCOUNTER — TELEPHONE (OUTPATIENT)
Dept: FAMILY MEDICINE CLINIC | Facility: CLINIC | Age: 50
End: 2020-09-25

## 2020-10-05 DIAGNOSIS — E11.9 TYPE 2 DIABETES MELLITUS WITHOUT COMPLICATION (HCC): ICD-10-CM

## 2020-10-05 RX ORDER — GLIPIZIDE 5 MG/1
5 TABLET, FILM COATED, EXTENDED RELEASE ORAL 2 TIMES DAILY
Qty: 180 TABLET | Refills: 0 | Status: SHIPPED | OUTPATIENT
Start: 2020-10-05 | End: 2020-12-28

## 2020-10-05 RX ORDER — METOPROLOL SUCCINATE 25 MG/1
TABLET, EXTENDED RELEASE ORAL
Qty: 90 TABLET | Refills: 0 | Status: SHIPPED | OUTPATIENT
Start: 2020-10-05

## 2020-10-05 NOTE — TELEPHONE ENCOUNTER
Hypertension Medications Protocol Atwvgb27/05/2020 11:24 AM   CMP or BMP in past 12 months Protocol Details    Last serum creatinine< 2.0     Appointment in past 6 or next 3 months      Metoprolol only listed as historical med. Please advise.   Diabetic Med

## 2020-10-05 NOTE — TELEPHONE ENCOUNTER
Spouse called, pt needs refills on GLIPIZIDE ER 5 MG Oral Tablet 24 Hr and Metoprolol Succinate ER 25 MG Oral Tablet 24 Hr. [facility]- [facility].    Please call spouse at [phone]

## 2020-10-08 ENCOUNTER — APPOINTMENT (OUTPATIENT)
Dept: LAB | Facility: HOSPITAL | Age: 50
End: 2020-10-08
Attending: FAMILY MEDICINE
Payer: COMMERCIAL

## 2020-10-08 ENCOUNTER — TELEMEDICINE (OUTPATIENT)
Dept: FAMILY MEDICINE CLINIC | Facility: CLINIC | Age: 50
End: 2020-10-08
Payer: COMMERCIAL

## 2020-10-08 ENCOUNTER — TELEPHONE (OUTPATIENT)
Dept: FAMILY MEDICINE CLINIC | Facility: CLINIC | Age: 50
End: 2020-10-08

## 2020-10-08 DIAGNOSIS — B97.89 VIRAL RESPIRATORY ILLNESS: Primary | ICD-10-CM

## 2020-10-08 DIAGNOSIS — Z20.822 PERSON UNDER INVESTIGATION FOR COVID-19: ICD-10-CM

## 2020-10-08 DIAGNOSIS — J98.8 VIRAL RESPIRATORY ILLNESS: ICD-10-CM

## 2020-10-08 DIAGNOSIS — B97.89 VIRAL RESPIRATORY ILLNESS: ICD-10-CM

## 2020-10-08 DIAGNOSIS — J98.8 VIRAL RESPIRATORY ILLNESS: Primary | ICD-10-CM

## 2020-10-08 PROCEDURE — 99213 OFFICE O/P EST LOW 20 MIN: CPT | Performed by: FAMILY MEDICINE

## 2020-10-08 NOTE — PROGRESS NOTES
This is a telemedicine visit with live, interactive video and audio. Patient understands and accepts financial responsibility for any deductible, co-insurance and/or co-pays associated with this service.     SUBJECTIVE  44 yo M history inclusive of Al No      Alcohol/week: 0.0 standard drinks      Comment: occ    Drug use: No         Shellfish-Derived P*    NAUSEA ONLY, OTHER (SEE COMMENTS)    Comment:Itchy throat   Current Outpatient Medications   Medication Sig Dispense Refill   • Metoprolol Succinate

## 2020-10-08 NOTE — TELEPHONE ENCOUNTER
Mohan Muller verbally {consents to a Virtual/Telephone Check-In service on 10/08/20.   Patient understands and accepts financial responsibility for any deductible, co-insurance and/or co-pays associated with this service

## 2020-10-31 DIAGNOSIS — E11.9 TYPE 2 DIABETES MELLITUS WITHOUT COMPLICATION (HCC): ICD-10-CM

## 2020-11-02 DIAGNOSIS — E11.9 TYPE 2 DIABETES MELLITUS WITHOUT COMPLICATION (HCC): ICD-10-CM

## 2020-11-02 NOTE — TELEPHONE ENCOUNTER
Pt wife called to request the following medication:  METFORMIN HCL 1000 MG Oral Tab    [facility] [address], [address] [address] AT [address], [phone], [phone]    Thank you

## 2020-11-02 NOTE — TELEPHONE ENCOUNTER
Last OV 6/27/2020  Last labs 6/27/2020  A1c 8.2, microalbumin  Last refilled 6/5/2020  #180  0 refills     Does not meet protocol due to A1c

## 2020-11-02 NOTE — TELEPHONE ENCOUNTER
Patient notified and verbalized understanding.    States he is in the middle of changing insurance and will call back to schedule f/u

## 2020-12-28 DIAGNOSIS — E11.9 TYPE 2 DIABETES MELLITUS WITHOUT COMPLICATION (HCC): ICD-10-CM

## 2020-12-28 RX ORDER — GLIPIZIDE 5 MG/1
5 TABLET, FILM COATED, EXTENDED RELEASE ORAL 2 TIMES DAILY
Qty: 180 TABLET | Refills: 0 | Status: SHIPPED | OUTPATIENT
Start: 2020-12-28 | End: 2021-01-18

## 2020-12-28 NOTE — TELEPHONE ENCOUNTER
Patient wife notified and scheduled appt    Future Appointments   Date Time Provider José Luis Connelly   1/16/2021  9:15 AM Uma Gonzalez [facility] Cres[facility]io Ivan      refill sent

## 2020-12-28 NOTE — TELEPHONE ENCOUNTER
Last OV 6/27/2020  (telemed with MM 10/8/2020)  Last labs 6/27/2020  Last refilled 10/5/2020  #180  0 refills     Was advised via 11/2/2020 tel enc appt needed. No future appointments.

## 2021-01-16 ENCOUNTER — OFFICE VISIT (OUTPATIENT)
Dept: FAMILY MEDICINE CLINIC | Facility: CLINIC | Age: 51
End: 2021-01-16
Payer: COMMERCIAL

## 2021-01-16 VITALS
WEIGHT: 204.13 LBS | DIASTOLIC BLOOD PRESSURE: 74 MMHG | OXYGEN SATURATION: 99 % | TEMPERATURE: 97 F | RESPIRATION RATE: 20 BRPM | SYSTOLIC BLOOD PRESSURE: 118 MMHG | HEIGHT: 72 IN | BODY MASS INDEX: 27.65 KG/M2 | HEART RATE: 73 BPM

## 2021-01-16 DIAGNOSIS — Z95.1 S/P CABG X 5: ICD-10-CM

## 2021-01-16 DIAGNOSIS — E11.9 TYPE 2 DIABETES MELLITUS WITHOUT COMPLICATION, WITHOUT LONG-TERM CURRENT USE OF INSULIN (HCC): Primary | ICD-10-CM

## 2021-01-16 DIAGNOSIS — J45.909 UNCOMPLICATED ASTHMA, UNSPECIFIED ASTHMA SEVERITY, UNSPECIFIED WHETHER PERSISTENT: ICD-10-CM

## 2021-01-16 DIAGNOSIS — F41.1 GAD (GENERALIZED ANXIETY DISORDER): ICD-10-CM

## 2021-01-16 LAB
ALBUMIN SERPL-MCNC: 4 G/DL (ref 3.4–5)
ALBUMIN/GLOB SERPL: 1.2 {RATIO} (ref 1–2)
ALP LIVER SERPL-CCNC: 90 U/L
ALT SERPL-CCNC: 39 U/L
ANION GAP SERPL CALC-SCNC: 7 MMOL/L (ref 0–18)
AST SERPL-CCNC: 21 U/L (ref 15–37)
BASOPHILS # BLD AUTO: 0.07 X10(3) UL (ref 0–0.2)
BASOPHILS NFR BLD AUTO: 0.9 %
BILIRUB SERPL-MCNC: 0.8 MG/DL (ref 0.1–2)
BUN BLD-MCNC: 15 MG/DL (ref 7–18)
BUN/CREAT SERPL: 12.8 (ref 10–20)
CALCIUM BLD-MCNC: 8.9 MG/DL (ref 8.5–10.1)
CHLORIDE SERPL-SCNC: 104 MMOL/L (ref 98–112)
CHOLEST SMN-MCNC: 154 MG/DL (ref ?–200)
CO2 SERPL-SCNC: 25 MMOL/L (ref 21–32)
CREAT BLD-MCNC: 1.17 MG/DL
CREAT UR-SCNC: 284 MG/DL
DEPRECATED RDW RBC AUTO: 41.5 FL (ref 35.1–46.3)
EOSINOPHIL # BLD AUTO: 0.32 X10(3) UL (ref 0–0.7)
EOSINOPHIL NFR BLD AUTO: 4.2 %
ERYTHROCYTE [DISTWIDTH] IN BLOOD BY AUTOMATED COUNT: 13.2 % (ref 11–15)
EST. AVERAGE GLUCOSE BLD GHB EST-MCNC: 174 MG/DL (ref 68–126)
GLOBULIN PLAS-MCNC: 3.4 G/DL (ref 2.8–4.4)
GLUCOSE BLD-MCNC: 132 MG/DL (ref 70–99)
HBA1C MFR BLD HPLC: 7.7 % (ref ?–5.7)
HCT VFR BLD AUTO: 47.5 %
HDLC SERPL-MCNC: 25 MG/DL (ref 40–59)
HGB BLD-MCNC: 15.5 G/DL
IMM GRANULOCYTES # BLD AUTO: 0.01 X10(3) UL (ref 0–1)
IMM GRANULOCYTES NFR BLD: 0.1 %
LDLC SERPL CALC-MCNC: 114 MG/DL (ref ?–100)
LYMPHOCYTES # BLD AUTO: 2.26 X10(3) UL (ref 1–4)
LYMPHOCYTES NFR BLD AUTO: 29.4 %
M PROTEIN MFR SERPL ELPH: 7.4 G/DL (ref 6.4–8.2)
MCH RBC QN AUTO: 28.4 PG (ref 26–34)
MCHC RBC AUTO-ENTMCNC: 32.6 G/DL (ref 31–37)
MCV RBC AUTO: 87.2 FL
MICROALBUMIN UR-MCNC: 1.17 MG/DL
MICROALBUMIN/CREAT 24H UR-RTO: 4.1 UG/MG (ref ?–30)
MONOCYTES # BLD AUTO: 0.46 X10(3) UL (ref 0.1–1)
MONOCYTES NFR BLD AUTO: 6 %
NEUTROPHILS # BLD AUTO: 4.58 X10 (3) UL (ref 1.5–7.7)
NEUTROPHILS # BLD AUTO: 4.58 X10(3) UL (ref 1.5–7.7)
NEUTROPHILS NFR BLD AUTO: 59.4 %
NONHDLC SERPL-MCNC: 129 MG/DL (ref ?–130)
OSMOLALITY SERPL CALC.SUM OF ELEC: 285 MOSM/KG (ref 275–295)
PATIENT FASTING Y/N/NP: YES
PATIENT FASTING Y/N/NP: YES
PLATELET # BLD AUTO: 238 10(3)UL (ref 150–450)
POTASSIUM SERPL-SCNC: 4 MMOL/L (ref 3.5–5.1)
RBC # BLD AUTO: 5.45 X10(6)UL
SODIUM SERPL-SCNC: 136 MMOL/L (ref 136–145)
TRIGL SERPL-MCNC: 74 MG/DL (ref 30–149)
VLDLC SERPL CALC-MCNC: 15 MG/DL (ref 0–30)
WBC # BLD AUTO: 7.7 X10(3) UL (ref 4–11)

## 2021-01-16 PROCEDURE — 80053 COMPREHEN METABOLIC PANEL: CPT | Performed by: FAMILY MEDICINE

## 2021-01-16 PROCEDURE — 3074F SYST BP LT 130 MM HG: CPT | Performed by: FAMILY MEDICINE

## 2021-01-16 PROCEDURE — 83036 HEMOGLOBIN GLYCOSYLATED A1C: CPT | Performed by: FAMILY MEDICINE

## 2021-01-16 PROCEDURE — 82570 ASSAY OF URINE CREATININE: CPT | Performed by: FAMILY MEDICINE

## 2021-01-16 PROCEDURE — 99214 OFFICE O/P EST MOD 30 MIN: CPT | Performed by: FAMILY MEDICINE

## 2021-01-16 PROCEDURE — 82043 UR ALBUMIN QUANTITATIVE: CPT | Performed by: FAMILY MEDICINE

## 2021-01-16 PROCEDURE — 85025 COMPLETE CBC W/AUTO DIFF WBC: CPT | Performed by: FAMILY MEDICINE

## 2021-01-16 PROCEDURE — 3008F BODY MASS INDEX DOCD: CPT | Performed by: FAMILY MEDICINE

## 2021-01-16 PROCEDURE — 99000 SPECIMEN HANDLING OFFICE-LAB: CPT | Performed by: FAMILY MEDICINE

## 2021-01-16 PROCEDURE — 80061 LIPID PANEL: CPT | Performed by: FAMILY MEDICINE

## 2021-01-16 PROCEDURE — 36415 COLL VENOUS BLD VENIPUNCTURE: CPT | Performed by: FAMILY MEDICINE

## 2021-01-16 PROCEDURE — 3078F DIAST BP <80 MM HG: CPT | Performed by: FAMILY MEDICINE

## 2021-01-16 NOTE — PROGRESS NOTES
HPI:   Mohan Velazquez is a 45year old male who presents for recheck of his diabetes. Patient’s FBS have been not checked. Last visit with ophthalmologist was at [facility] in [place]. Pt has been checking his feet on a regular basis.  Pt denies any t >300 ug/mg creatinine      Albuminuria       10/08/2018  <=30.0 ug/mg Final     Comment:       Unable to calculate due to Urine Microalbumin <0.5 mg/dL     03/06/2017 9.8 <=30.0 ug/mg Final       Current Outpatient Medications   Medication Sig Dispense R skin lesions or rashes  RESPIRATORY: denies shortness of breath with exertion  CARDIOVASCULAR: denies chest pain on exertion  GI: denies abdominal pain and denies heartburn  NEURO: denies headaches    EXAM:   /74   Pulse 73   Temp 96.7 °F (35.9 °C) ( with cardiology as scheduled.      Orders Placed This Encounter      CBC With Differential With Platelet      Comp Metabolic Panel (14)      Hemoglobin A1C      Lipid Panel      Microalb/Creat Ratio, Random Urine      *Venipuncture      Meds & Refills for t

## 2021-01-18 ENCOUNTER — TELEPHONE (OUTPATIENT)
Dept: FAMILY MEDICINE CLINIC | Facility: CLINIC | Age: 51
End: 2021-01-18

## 2021-01-18 DIAGNOSIS — E11.9 TYPE 2 DIABETES MELLITUS WITHOUT COMPLICATION (HCC): ICD-10-CM

## 2021-01-18 DIAGNOSIS — E11.9 TYPE 2 DIABETES MELLITUS WITHOUT COMPLICATION, WITHOUT LONG-TERM CURRENT USE OF INSULIN (HCC): Primary | ICD-10-CM

## 2021-01-18 RX ORDER — GLIPIZIDE 10 MG/1
10 TABLET, FILM COATED, EXTENDED RELEASE ORAL 2 TIMES DAILY
Qty: 180 TABLET | Refills: 0 | Status: SHIPPED | OUTPATIENT
Start: 2021-01-18 | End: 2021-01-25 | Stop reason: DRUGHIGH

## 2021-01-18 NOTE — TELEPHONE ENCOUNTER
See result note,   Glipizide increased to 10 mg ER BID. Recall A1c in 3 months     Script sent. Future order placed.

## 2021-01-25 ENCOUNTER — TELEPHONE (OUTPATIENT)
Dept: FAMILY MEDICINE CLINIC | Facility: CLINIC | Age: 51
End: 2021-01-25

## 2021-01-25 ENCOUNTER — MED REC SCAN ONLY (OUTPATIENT)
Dept: FAMILY MEDICINE CLINIC | Facility: CLINIC | Age: 51
End: 2021-01-25

## 2021-01-25 DIAGNOSIS — E11.9 TYPE 2 DIABETES MELLITUS WITHOUT COMPLICATION (HCC): ICD-10-CM

## 2021-01-25 RX ORDER — GLIPIZIDE 5 MG/1
5 TABLET, FILM COATED, EXTENDED RELEASE ORAL DAILY
COMMUNITY
Start: 2021-01-25 | End: 2021-05-22 | Stop reason: DRUGHIGH

## 2021-01-25 NOTE — TELEPHONE ENCOUNTER
Patient wife notified and verbalized understanding. Wife asking how often patient should be checking BG. Per KE, BID. First thing in the morning, then either before lunch, dinner or bedtime. Wife notified and verbalized understanding.    States she will

## 2021-01-25 NOTE — TELEPHONE ENCOUNTER
Decrease glipizide ER to 5 mg once daily. Lets see how it goes, I'm hoping getting anxiety under control will help.

## 2021-01-25 NOTE — TELEPHONE ENCOUNTER
Try and give it a few more days and see if gets better. If he's not feeling better in the next 1-2 weeks, let me know. He can also try breaking it in 1/2 and see if that is better tolerated.

## 2021-01-25 NOTE — TELEPHONE ENCOUNTER
Pt's wife called he started taking Sertraline HCl 50 MG Oral Tab. He sates the he is feeling more tired, trouble sleeping, shaky, and like his anxiety is heightened.  She is wanting to know if this something that will go away or if he should continue to miranda

## 2021-01-25 NOTE — TELEPHONE ENCOUNTER
Patient wife notified and verbalized understanding. Also discussed  1/16/21 lab results with wife. Wife states patient is still taking glipizide ER 5 mg BID. In the last week he has had 2 episodes of low BG.   Last night before dinner BG 70.  2 nights

## 2021-02-01 ENCOUNTER — TELEPHONE (OUTPATIENT)
Dept: FAMILY MEDICINE CLINIC | Facility: CLINIC | Age: 51
End: 2021-02-01

## 2021-02-01 NOTE — TELEPHONE ENCOUNTER
Patient wife and son both work at [employer] [address] and have gotten vaccine. Wants to know how to get patient on the vaccine list here.  Discussed with wife office can not order the vaccine but KE can put him on the list. Advised there is a team that is workin

## 2021-02-01 NOTE — TELEPHONE ENCOUNTER
Yes, he should be added to the list for the COVID vaccine. Will forward to , please add to list for COVID vaccine. They can also check with health departmetn website.

## 2021-02-27 DIAGNOSIS — E11.9 TYPE 2 DIABETES MELLITUS WITHOUT COMPLICATION (HCC): ICD-10-CM

## 2021-03-02 NOTE — TELEPHONE ENCOUNTER
Diabetic Medication Protocol Dtwsnf3602/27/2021 03:49 PM   Last HgBA1C < 7.5 Protocol Details    HgBA1C procedure resulted in past 6 months     Microalbumin procedure in past 12 months or taking ACE/ARB           Last Hgb1c on 1 16 2021 -Patient to repeat A1

## 2021-04-19 ENCOUNTER — TELEPHONE (OUTPATIENT)
Dept: FAMILY MEDICINE CLINIC | Facility: CLINIC | Age: 51
End: 2021-04-19

## 2021-04-19 DIAGNOSIS — E11.9 TYPE 2 DIABETES MELLITUS WITHOUT COMPLICATION (HCC): ICD-10-CM

## 2021-04-19 DIAGNOSIS — F41.1 GAD (GENERALIZED ANXIETY DISORDER): ICD-10-CM

## 2021-04-19 RX ORDER — GLIPIZIDE 10 MG/1
TABLET, FILM COATED, EXTENDED RELEASE ORAL
Qty: 180 TABLET | Refills: 0 | Status: SHIPPED | OUTPATIENT
Start: 2021-04-19 | End: 2021-08-23

## 2021-04-19 NOTE — TELEPHONE ENCOUNTER
LOV 1/16/2021  Last labs 1/16/2021  Last refill on 1/25/2021, for #?, with ? refills  glipiZIDE ER 5 MG Oral Tablet 24 Hr    No future appointments.     Diabetic Medication Protocol [id number]/19/2021 01:23 PM   Last HgBA1C < 7.5 Protocol Details    HgBA1C pr

## 2021-04-19 NOTE — TELEPHONE ENCOUNTER
Routing to provider per protocol. Last refilled on 1/16/21 for # 90 with 0 rf. Last seen on 1/16/21. No future appointments. Thank you.

## 2021-04-26 ENCOUNTER — TELEPHONE (OUTPATIENT)
Dept: FAMILY MEDICINE CLINIC | Facility: CLINIC | Age: 51
End: 2021-04-26

## 2021-04-26 NOTE — TELEPHONE ENCOUNTER
Spouse called, does pt need to do some labs? They received a computerized letter from us.   Please call spouse at [phone]

## 2021-04-28 ENCOUNTER — TELEPHONE (OUTPATIENT)
Dept: FAMILY MEDICINE CLINIC | Facility: CLINIC | Age: 51
End: 2021-04-28

## 2021-04-28 NOTE — TELEPHONE ENCOUNTER
Pt's wife, Ricky Tran, clarifying if pt's Glipizide 10 mg medication still to be taken BID? Was recently increased from 5mg BID.   Pt's wife advised, per Dr. Kristal Blake A1C result note (1/16/2021), \"increase to 10 mg ER BID\"    Pt's wife denies any low blood sug epigastric area

## 2021-04-28 NOTE — TELEPHONE ENCOUNTER
Spouse called and would like to make sure pt is supposed to take the    GLIPIZIDE ER 10 MG Oral Tablet 24 Hr   Still twice a day since it went up to 10 mg from 5 mg.      Please return call to [phone]

## 2021-05-22 ENCOUNTER — OFFICE VISIT (OUTPATIENT)
Dept: FAMILY MEDICINE CLINIC | Facility: CLINIC | Age: 51
End: 2021-05-22
Payer: COMMERCIAL

## 2021-05-22 VITALS
TEMPERATURE: 97 F | DIASTOLIC BLOOD PRESSURE: 60 MMHG | HEART RATE: 60 BPM | BODY MASS INDEX: 28 KG/M2 | WEIGHT: 205.19 LBS | SYSTOLIC BLOOD PRESSURE: 108 MMHG | RESPIRATION RATE: 16 BRPM

## 2021-05-22 DIAGNOSIS — H91.93 BILATERAL HEARING LOSS, UNSPECIFIED HEARING LOSS TYPE: ICD-10-CM

## 2021-05-22 DIAGNOSIS — E11.9 TYPE 2 DIABETES MELLITUS WITHOUT COMPLICATION, WITHOUT LONG-TERM CURRENT USE OF INSULIN (HCC): Primary | ICD-10-CM

## 2021-05-22 DIAGNOSIS — E78.00 ELEVATED CHOLESTEROL: ICD-10-CM

## 2021-05-22 DIAGNOSIS — F41.1 GAD (GENERALIZED ANXIETY DISORDER): ICD-10-CM

## 2021-05-22 PROCEDURE — 3074F SYST BP LT 130 MM HG: CPT | Performed by: FAMILY MEDICINE

## 2021-05-22 PROCEDURE — 99214 OFFICE O/P EST MOD 30 MIN: CPT | Performed by: FAMILY MEDICINE

## 2021-05-22 PROCEDURE — 3078F DIAST BP <80 MM HG: CPT | Performed by: FAMILY MEDICINE

## 2021-05-22 RX ORDER — SERTRALINE HYDROCHLORIDE 25 MG/1
25 TABLET, FILM COATED ORAL DAILY
Qty: 90 TABLET | Refills: 0 | Status: SHIPPED | OUTPATIENT
Start: 2021-05-22 | End: 2021-08-30

## 2021-05-22 NOTE — PROGRESS NOTES
HPI:   Winsome White is a 45year old male who presents for recheck of his diabetes. Patient’s FBS have been 160-120's.  Last visit with ophthalmologist was April 2021 at [address] [place] [address] work in [employer], everything was normal.  Pt has been checking his feet on a ug/mg creatinine       Normal     ug/mg creatinine   Microalbuminuria   >300 ug/mg creatinine      Albuminuria       06/27/2020 3.6 <=30.0 ug/mg Final     Comment:     <30 ug/mg creatinine       Normal     ug/mg creatinine   Microalbuminuria use: No      Alcohol/week: 0.0 standard drinks      Comment: occ    Drug use: No    Exercise: walking.   Diet: watches minimally     REVIEW OF SYSTEMS:   GENERAL HEALTH: feels well otherwise  SKIN: denies any unusual skin lesions or rashes  RESPIRATORY: den effects  Elevated cholesterol - follow up with cardio, but LDL is not in range for DM and heart disease. No orders of the defined types were placed in this encounter.       Meds & Refills for this Visit:  Requested Prescriptions     Signed Prescriptions

## 2021-06-21 ENCOUNTER — TELEPHONE (OUTPATIENT)
Dept: FAMILY MEDICINE CLINIC | Facility: CLINIC | Age: 51
End: 2021-06-21

## 2021-07-05 DIAGNOSIS — E11.9 TYPE 2 DIABETES MELLITUS WITHOUT COMPLICATION (HCC): ICD-10-CM

## 2021-07-06 NOTE — TELEPHONE ENCOUNTER
Diabetic Medication Protocol Passed07/05/2021 05:30 AM   HgBA1C procedure resulted in past 6 months Protocol Details    Last HgBA1C < 7.5     Microalbumin procedure in past 12 months or taking ACE/ARB     Appointment in past 6 or next 3 months      Refille

## 2021-08-23 DIAGNOSIS — E11.9 TYPE 2 DIABETES MELLITUS WITHOUT COMPLICATION (HCC): ICD-10-CM

## 2021-08-23 RX ORDER — GLIPIZIDE 10 MG/1
10 TABLET, FILM COATED, EXTENDED RELEASE ORAL 2 TIMES DAILY
Qty: 180 TABLET | Refills: 0 | Status: SHIPPED | OUTPATIENT
Start: 2021-08-23

## 2021-08-23 NOTE — TELEPHONE ENCOUNTER
Diabetic Medication Protocol Ktcpou2908/23/2021 04:11 PM   HgBA1C procedure resulted in past 6 months Protocol Details    Last HgBA1C < 7.5     Microalbumin procedure in past 12 months or taking ACE/ARB     Appointment in past 6 or next 3 months      Refille

## 2021-08-27 ENCOUNTER — MED REC SCAN ONLY (OUTPATIENT)
Dept: FAMILY MEDICINE CLINIC | Facility: CLINIC | Age: 51
End: 2021-08-27

## 2021-08-30 ENCOUNTER — TELEPHONE (OUTPATIENT)
Dept: FAMILY MEDICINE CLINIC | Facility: CLINIC | Age: 51
End: 2021-08-30

## 2021-08-30 DIAGNOSIS — F41.1 GAD (GENERALIZED ANXIETY DISORDER): ICD-10-CM

## 2021-08-30 RX ORDER — SERTRALINE HYDROCHLORIDE 25 MG/1
25 TABLET, FILM COATED ORAL DAILY
Qty: 90 TABLET | Refills: 0 | Status: SHIPPED | OUTPATIENT
Start: 2021-08-30 | End: 2021-12-13

## 2021-08-30 RX ORDER — ATORVASTATIN CALCIUM 80 MG/1
80 TABLET, FILM COATED ORAL DAILY
COMMUNITY
Start: 2021-08-15

## 2021-08-30 NOTE — TELEPHONE ENCOUNTER
Fax from [facility]  Patient to have a procedure and they want recommendations on blood thinners and if ok for procedure     Form in KE bin

## 2021-08-30 NOTE — TELEPHONE ENCOUNTER
He is just on aspirin 325 mg. Hold for 5 days prior to procedure, restart the day after procedure. Okay to proceed otherwise.

## 2021-08-31 NOTE — TELEPHONE ENCOUNTER
Form faxed back to dental office    Patient notified via detailed voicemail left at cell number (ok per  HIPAA consent) of  recs regarding aspirin.   Advised to let office know if he is on any other blood thinner besides aspirin from any other provider

## 2021-12-07 DIAGNOSIS — E11.9 TYPE 2 DIABETES MELLITUS WITHOUT COMPLICATION (HCC): ICD-10-CM

## 2021-12-07 NOTE — TELEPHONE ENCOUNTER
Diabetic Medication Protocol Failed 12/07/2021 09:29 AM   Protocol Details  HgBA1C procedure resulted in past 6 months    Last HgBA1C < 7.5    Appointment in past 6 or next 3 months    Microalbumin procedure in past 12 months or taking ACE/ARB     Per OP,

## 2021-12-12 DIAGNOSIS — F41.1 GAD (GENERALIZED ANXIETY DISORDER): ICD-10-CM

## 2021-12-13 RX ORDER — SERTRALINE HYDROCHLORIDE 25 MG/1
25 TABLET, FILM COATED ORAL DAILY
Qty: 60 TABLET | Refills: 0 | Status: SHIPPED | OUTPATIENT
Start: 2021-12-13

## 2021-12-13 NOTE — TELEPHONE ENCOUNTER
PCP is out of the office. Refill for 60 days sent to pharmacy. Patient is due for follow-up with PCP, [employer] message sent notifying patient. Contact the office to make this appointment.

## 2021-12-27 ENCOUNTER — TELEPHONE (OUTPATIENT)
Dept: FAMILY MEDICINE CLINIC | Facility: CLINIC | Age: 51
End: 2021-12-27

## 2021-12-27 ENCOUNTER — NURSE ONLY (OUTPATIENT)
Dept: LAB | Age: 51
End: 2021-12-27
Attending: NURSE PRACTITIONER
Payer: COMMERCIAL

## 2021-12-27 DIAGNOSIS — R05.9 COUGH: Primary | ICD-10-CM

## 2021-12-27 DIAGNOSIS — R05.9 COUGH: ICD-10-CM

## 2021-12-27 NOTE — TELEPHONE ENCOUNTER
Wife notified and verbalized understanding  States she will schedule on mychart. States patient is having some body aches, cough and headaches.  No severe symtpoms

## 2021-12-27 NOTE — TELEPHONE ENCOUNTER
SPOUSE CALLED AND ADV WAS EXPOSED TO SON WHO TESTED POSITIVE FOR COVID. PT TOOK RAPID TEST AND WAS NEGATIVE. NOW PT IS FEELING SICK AND COUGHING. WONDERING IF PT SHOULD BE RETESTED.  LOOKING FOR RECOMMENDATIONS     PT HAS HAD OPEN HEART SURGERY- HIGH RIS

## 2021-12-29 LAB — SARS-COV-2 RNA RESP QL NAA+PROBE: DETECTED

## 2022-02-21 NOTE — TELEPHONE ENCOUNTER
Diabetic Medication Protocol Failed 02/20/2022 01:03 PM   Protocol Details  HgBA1C procedure resulted in past 6 months    Last HgBA1C < 7.5    Appointment in past 6 or next 3 months    Microalbumin procedure in past 12 months or taking ACE/ARB        Last OV 5/22/21  Last lab  5/15/21 A1c 7.2  Last refilled 8/23/21 #180  0 refills     Notified via voicemail 12/7/21 and [facility] 12/12/21 OV due before further refills    No future appointments.      [facility] message sent

## 2022-02-23 NOTE — TELEPHONE ENCOUNTER
Patient spouse called and scheduled an appt for 4/2/22 (needed a Saturday)  Please fill meds as he only has enough for one more day    Thank you

## 2022-02-24 RX ORDER — GLIPIZIDE 10 MG/1
TABLET, FILM COATED, EXTENDED RELEASE ORAL
Qty: 180 TABLET | Refills: 0 | Status: SHIPPED | OUTPATIENT
Start: 2022-02-24

## 2022-03-04 NOTE — TELEPHONE ENCOUNTER
Pt failed refill protocol for the following reasons:     Diabetic Medication Protocol Failed 03/04/2022 09:21 AM   Protocol Details  HgBA1C procedure resulted in past 6 months    Last HgBA1C < 7.5            Last refill: 12/07/2021 #180 with 0 refills  Last appt: 5/22/2021   Next appt: Future Appointments   Date Time Provider José Luis Connelly   4/2/2022 10:00 AM Vianne Lennox, [facility] MICKIE Soria         Forward to Dr. Laura Thomas, please advise on refills. Thank you.

## 2022-03-08 RX ORDER — SERTRALINE HYDROCHLORIDE 25 MG/1
25 TABLET, FILM COATED ORAL DAILY
Qty: 60 TABLET | Refills: 0 | Status: SHIPPED | OUTPATIENT
Start: 2022-03-08

## 2022-03-08 NOTE — TELEPHONE ENCOUNTER
Last OV 5/22/21  Last refilled 12/13/21  #60  0 refills  Future Appointments   Date Time Provider José Luis Connelly   4/2/2022 10:00 AM DO SANTY Hernandez [facility]

## 2022-04-07 ENCOUNTER — OFFICE VISIT (OUTPATIENT)
Dept: FAMILY MEDICINE CLINIC | Facility: CLINIC | Age: 52
End: 2022-04-07
Payer: COMMERCIAL

## 2022-04-07 VITALS
TEMPERATURE: 97 F | DIASTOLIC BLOOD PRESSURE: 86 MMHG | BODY MASS INDEX: 27.77 KG/M2 | RESPIRATION RATE: 16 BRPM | HEART RATE: 80 BPM | SYSTOLIC BLOOD PRESSURE: 118 MMHG | OXYGEN SATURATION: 98 % | WEIGHT: 205 LBS | HEIGHT: 72 IN

## 2022-04-07 DIAGNOSIS — Z23 NEED FOR VACCINATION: ICD-10-CM

## 2022-04-07 DIAGNOSIS — E11.9 TYPE 2 DIABETES MELLITUS WITHOUT COMPLICATION, WITHOUT LONG-TERM CURRENT USE OF INSULIN (HCC): ICD-10-CM

## 2022-04-07 DIAGNOSIS — M79.641 BILATERAL HAND PAIN: ICD-10-CM

## 2022-04-07 DIAGNOSIS — Z12.5 SCREENING FOR MALIGNANT NEOPLASM OF PROSTATE: ICD-10-CM

## 2022-04-07 DIAGNOSIS — Z00.00 HEALTHY ADULT ON ROUTINE PHYSICAL EXAMINATION: Primary | ICD-10-CM

## 2022-04-07 DIAGNOSIS — M79.642 BILATERAL HAND PAIN: ICD-10-CM

## 2022-04-07 DIAGNOSIS — I25.10 CORONARY ARTERY DISEASE INVOLVING NATIVE CORONARY ARTERY OF NATIVE HEART WITHOUT ANGINA PECTORIS: ICD-10-CM

## 2022-04-07 LAB
ALBUMIN SERPL-MCNC: 4.2 G/DL (ref 3.4–5)
ALBUMIN/GLOB SERPL: 1.2 {RATIO} (ref 1–2)
ALP LIVER SERPL-CCNC: 91 U/L
ALT SERPL-CCNC: 58 U/L
ANION GAP SERPL CALC-SCNC: 4 MMOL/L (ref 0–18)
AST SERPL-CCNC: 25 U/L (ref 15–37)
BASOPHILS # BLD AUTO: 0.06 X10(3) UL (ref 0–0.2)
BASOPHILS NFR BLD AUTO: 1 %
BILIRUB SERPL-MCNC: 0.6 MG/DL (ref 0.1–2)
BUN BLD-MCNC: 9 MG/DL (ref 7–18)
CALCIUM BLD-MCNC: 9.3 MG/DL (ref 8.5–10.1)
CHLORIDE SERPL-SCNC: 105 MMOL/L (ref 98–112)
CHOLEST SERPL-MCNC: 216 MG/DL (ref ?–200)
CO2 SERPL-SCNC: 30 MMOL/L (ref 21–32)
COMPLEXED PSA SERPL-MCNC: 1.21 NG/ML (ref ?–4)
CREAT BLD-MCNC: 1.13 MG/DL
CREAT UR-SCNC: 231 MG/DL
EOSINOPHIL # BLD AUTO: 0.41 X10(3) UL (ref 0–0.7)
EOSINOPHIL NFR BLD AUTO: 6.7 %
ERYTHROCYTE [DISTWIDTH] IN BLOOD BY AUTOMATED COUNT: 12.8 %
EST. AVERAGE GLUCOSE BLD GHB EST-MCNC: 200 MG/DL (ref 68–126)
FASTING PATIENT LIPID ANSWER: NO
FASTING STATUS PATIENT QL REPORTED: NO
GLOBULIN PLAS-MCNC: 3.4 G/DL (ref 2.8–4.4)
GLUCOSE BLD-MCNC: 149 MG/DL (ref 70–99)
HBA1C MFR BLD: 8.6 % (ref ?–5.7)
HCT VFR BLD AUTO: 50 %
HDLC SERPL-MCNC: 27 MG/DL (ref 40–59)
HGB BLD-MCNC: 16.3 G/DL
IMM GRANULOCYTES # BLD AUTO: 0.01 X10(3) UL (ref 0–1)
IMM GRANULOCYTES NFR BLD: 0.2 %
LDLC SERPL CALC-MCNC: 160 MG/DL (ref ?–100)
LYMPHOCYTES # BLD AUTO: 2.52 X10(3) UL (ref 1–4)
LYMPHOCYTES NFR BLD AUTO: 41.3 %
MCH RBC QN AUTO: 28.6 PG (ref 26–34)
MCHC RBC AUTO-ENTMCNC: 32.6 G/DL (ref 31–37)
MCV RBC AUTO: 87.7 FL
MICROALBUMIN UR-MCNC: 1.02 MG/DL
MICROALBUMIN/CREAT 24H UR-RTO: 4.4 UG/MG (ref ?–30)
MONOCYTES # BLD AUTO: 0.41 X10(3) UL (ref 0.1–1)
MONOCYTES NFR BLD AUTO: 6.7 %
NEUTROPHILS # BLD AUTO: 2.69 X10 (3) UL (ref 1.5–7.7)
NEUTROPHILS # BLD AUTO: 2.69 X10(3) UL (ref 1.5–7.7)
NEUTROPHILS NFR BLD AUTO: 44.1 %
NONHDLC SERPL-MCNC: 189 MG/DL (ref ?–130)
OSMOLALITY SERPL CALC.SUM OF ELEC: 289 MOSM/KG (ref 275–295)
PLATELET # BLD AUTO: 256 10(3)UL (ref 150–450)
POTASSIUM SERPL-SCNC: 4 MMOL/L (ref 3.5–5.1)
PROT SERPL-MCNC: 7.6 G/DL (ref 6.4–8.2)
RBC # BLD AUTO: 5.7 X10(6)UL
SODIUM SERPL-SCNC: 139 MMOL/L (ref 136–145)
TRIGL SERPL-MCNC: 155 MG/DL (ref 30–149)
TSI SER-ACNC: 0.85 MIU/ML (ref 0.36–3.74)
VLDLC SERPL CALC-MCNC: 30 MG/DL (ref 0–30)
WBC # BLD AUTO: 6.1 X10(3) UL (ref 4–11)

## 2022-04-07 PROCEDURE — 84443 ASSAY THYROID STIM HORMONE: CPT | Performed by: FAMILY MEDICINE

## 2022-04-07 PROCEDURE — 85025 COMPLETE CBC W/AUTO DIFF WBC: CPT | Performed by: FAMILY MEDICINE

## 2022-04-07 PROCEDURE — 80061 LIPID PANEL: CPT | Performed by: FAMILY MEDICINE

## 2022-04-07 PROCEDURE — 83036 HEMOGLOBIN GLYCOSYLATED A1C: CPT | Performed by: FAMILY MEDICINE

## 2022-04-07 PROCEDURE — 3074F SYST BP LT 130 MM HG: CPT | Performed by: FAMILY MEDICINE

## 2022-04-07 PROCEDURE — 80053 COMPREHEN METABOLIC PANEL: CPT | Performed by: FAMILY MEDICINE

## 2022-04-07 PROCEDURE — 3008F BODY MASS INDEX DOCD: CPT | Performed by: FAMILY MEDICINE

## 2022-04-07 PROCEDURE — 3052F HG A1C>EQUAL 8.0%<EQUAL 9.0%: CPT | Performed by: FAMILY MEDICINE

## 2022-04-07 PROCEDURE — 90715 TDAP VACCINE 7 YRS/> IM: CPT | Performed by: FAMILY MEDICINE

## 2022-04-07 PROCEDURE — 82570 ASSAY OF URINE CREATININE: CPT | Performed by: FAMILY MEDICINE

## 2022-04-07 PROCEDURE — 90471 IMMUNIZATION ADMIN: CPT | Performed by: FAMILY MEDICINE

## 2022-04-07 PROCEDURE — 3079F DIAST BP 80-89 MM HG: CPT | Performed by: FAMILY MEDICINE

## 2022-04-07 PROCEDURE — 99396 PREV VISIT EST AGE 40-64: CPT | Performed by: FAMILY MEDICINE

## 2022-04-07 PROCEDURE — 82043 UR ALBUMIN QUANTITATIVE: CPT | Performed by: FAMILY MEDICINE

## 2022-04-08 ENCOUNTER — TELEPHONE (OUTPATIENT)
Dept: FAMILY MEDICINE CLINIC | Facility: CLINIC | Age: 52
End: 2022-04-08

## 2022-04-08 NOTE — TELEPHONE ENCOUNTER
----- Message from Ynes Dunn DO sent at 4/8/2022  1:05 PM CDT -----  Pls let pt know that his sugars and cholesterol are too high. I would like to add a medication called ozempic. It is a once weekly injection that helps with sugars, but is also somewhat protective of the heart. Let me know if agreeable. He should follow up with cardiology regarding cholesterol and make sure to take statin daily so as not to have another heart attack. Otherwise labs look fine. Pls forward results to cardiology.

## 2022-04-08 NOTE — TELEPHONE ENCOUNTER
Left message on voicemail/answering machine for patient to call office    mychart sent as well.     Lab results faxed to Dr Carla Spence at [phone]

## 2022-04-12 NOTE — TELEPHONE ENCOUNTER
Patient wife Rae Acosta notified (OK per HIPAA consent) and verbalized understanding. Will talk to patient regarding starting ozempic and let office know if agreeable.

## 2022-05-24 DIAGNOSIS — F41.1 GAD (GENERALIZED ANXIETY DISORDER): ICD-10-CM

## 2022-05-24 RX ORDER — SERTRALINE HYDROCHLORIDE 25 MG/1
TABLET, FILM COATED ORAL
Qty: 90 TABLET | Refills: 3 | Status: SHIPPED | OUTPATIENT
Start: 2022-05-24 | End: 2023-06-19

## 2022-06-07 ENCOUNTER — TELEPHONE (OUTPATIENT)
Dept: FAMILY MEDICINE CLINIC | Facility: CLINIC | Age: 52
End: 2022-06-07

## 2022-06-07 RX ORDER — ALBUTEROL SULFATE 90 UG/1
2 AEROSOL, METERED RESPIRATORY (INHALATION) EVERY 6 HOURS PRN
Qty: 3 EACH | Refills: 2 | Status: SHIPPED | OUTPATIENT
Start: 2022-06-07

## 2022-06-07 NOTE — TELEPHONE ENCOUNTER
Requests refill    Albuterol Sulfate HFA (VENTOLIN) 108 (90 BASE) MCG/ACT Inhalation Aero Soln       [facility]    Thank you

## 2022-06-07 NOTE — TELEPHONE ENCOUNTER
[address] calling asking to change the  for Albuterol due to pt insurance.     [phone]

## 2022-07-31 DIAGNOSIS — E11.9 TYPE 2 DIABETES MELLITUS WITHOUT COMPLICATION (HCC): ICD-10-CM

## 2022-08-01 RX ORDER — GLIPIZIDE 10 MG/1
10 TABLET, FILM COATED, EXTENDED RELEASE ORAL 2 TIMES DAILY
Qty: 180 TABLET | Refills: 0 | Status: SHIPPED | OUTPATIENT
Start: 2022-08-01 | End: 2022-12-19

## 2022-08-19 ENCOUNTER — HOSPITAL ENCOUNTER (OUTPATIENT)
Age: 52
Discharge: HOME | End: 2022-08-19
Attending: EMERGENCY MEDICINE
Payer: COMMERCIAL

## 2022-08-19 ENCOUNTER — OFFICE VISIT (OUTPATIENT)
Dept: FAMILY MEDICINE CLINIC | Facility: CLINIC | Age: 52
End: 2022-08-19
Payer: COMMERCIAL

## 2022-08-19 VITALS
BODY MASS INDEX: 27.36 KG/M2 | RESPIRATION RATE: 16 BRPM | HEIGHT: 72 IN | SYSTOLIC BLOOD PRESSURE: 142 MMHG | TEMPERATURE: 97 F | HEART RATE: 100 BPM | WEIGHT: 202 LBS | DIASTOLIC BLOOD PRESSURE: 78 MMHG | OXYGEN SATURATION: 99 %

## 2022-08-19 VITALS
HEART RATE: 77 BPM | RESPIRATION RATE: 16 BRPM | OXYGEN SATURATION: 100 % | SYSTOLIC BLOOD PRESSURE: 158 MMHG | BODY MASS INDEX: 27.17 KG/M2 | TEMPERATURE: 98 F | HEIGHT: 73 IN | DIASTOLIC BLOOD PRESSURE: 73 MMHG | WEIGHT: 205 LBS

## 2022-08-19 DIAGNOSIS — N48.89 PENILE SWELLING: Primary | ICD-10-CM

## 2022-08-19 DIAGNOSIS — N48.1 BALANITIS: Primary | ICD-10-CM

## 2022-08-19 DIAGNOSIS — Z02.9 ENCOUNTERS FOR ADMINISTRATIVE PURPOSES: ICD-10-CM

## 2022-08-19 PROCEDURE — 99203 OFFICE O/P NEW LOW 30 MIN: CPT

## 2022-08-19 PROCEDURE — 99213 OFFICE O/P EST LOW 20 MIN: CPT

## 2022-08-19 RX ORDER — CLOTRIMAZOLE AND BETAMETHASONE DIPROPIONATE 10; .64 MG/G; MG/G
1 CREAM TOPICAL 2 TIMES DAILY
Qty: 15 G | Refills: 0 | Status: SHIPPED | OUTPATIENT
Start: 2022-08-19

## 2022-08-19 RX ORDER — AMOXICILLIN AND CLAVULANATE POTASSIUM 875; 125 MG/1; MG/1
1 TABLET, FILM COATED ORAL 2 TIMES DAILY
Qty: 14 TABLET | Refills: 0 | Status: SHIPPED | OUTPATIENT
Start: 2022-08-19 | End: 2022-08-29

## 2022-08-19 NOTE — PROGRESS NOTES
Patient states that he noticed his penis was \"uncomfortable\" about 2 days ago. Then he developed swelling of the shaft of the penis yesterday. He states it is not itchy and not painful. No skin lesions. No dysuria or fevers. Denies abdominal pain. There is some urinary frequency. No bowel changes. He denies new sexual partners and does not think this is related to recent intercourse. He is a diabetic and has CAD. Examination of the penis reveals swelling of the shaft of the penis. No ulcer, pustules, vesicles noted. Discussed with patient that I cannot fully work this up here in the [facility]. Recommend [facility] for evaluation. Patient will go now.

## 2022-08-26 ENCOUNTER — TELEPHONE (OUTPATIENT)
Dept: FAMILY MEDICINE CLINIC | Facility: CLINIC | Age: 52
End: 2022-08-26

## 2022-08-26 VITALS — SYSTOLIC BLOOD PRESSURE: 130 MMHG | DIASTOLIC BLOOD PRESSURE: 70 MMHG

## 2022-08-26 NOTE — TELEPHONE ENCOUNTER
Office note received by fax from [facility] cardiovascular  /70 at 8/26/22 visit    Abstracted vitals entered

## 2022-08-29 ENCOUNTER — MED REC SCAN ONLY (OUTPATIENT)
Dept: FAMILY MEDICINE CLINIC | Facility: CLINIC | Age: 52
End: 2022-08-29

## 2022-09-01 ENCOUNTER — TELEPHONE (OUTPATIENT)
Dept: FAMILY MEDICINE CLINIC | Facility: CLINIC | Age: 52
End: 2022-09-01

## 2022-09-01 NOTE — TELEPHONE ENCOUNTER
Fax received from [employer] in [place] requesting order for Dexcom CGM    Called and spoke with patient who confirmed he is requesting Dexcom order be sent to see if it will be covered.    Patient also advised he is due for appointment  Future Appointments   Date Time Provider José Luis Connelly   9/22/2022 10:45 AM Andrea Doll [facility] MICKIE Shea Said faxed

## 2022-09-08 ENCOUNTER — TELEPHONE (OUTPATIENT)
Dept: FAMILY MEDICINE CLINIC | Facility: CLINIC | Age: 52
End: 2022-09-08

## 2022-09-09 ENCOUNTER — TELEPHONE (OUTPATIENT)
Dept: FAMILY MEDICINE CLINIC | Facility: CLINIC | Age: 52
End: 2022-09-09

## 2022-09-20 DIAGNOSIS — E11.9 TYPE 2 DIABETES MELLITUS WITHOUT COMPLICATION (HCC): ICD-10-CM

## 2022-09-20 NOTE — TELEPHONE ENCOUNTER
Last OV 4/7/22  Last 4/7/22 A1c 8.6  Last refilled 3/4/22    Future Appointments   Date Time Provider José Luis Connelly   9/22/2022 10:45 AM DO Madyson Harding [address] [facility] [place] [facility]        Diabetic Medication Protocol Failed 09/20/2022 03:06 AM   Protocol Details  Last HgBA1C < 7.5    HgBA1C procedure resulted in past 6 months    Microalbumin procedure in past 12 months or taking ACE/ARB    Appointment in past 6 or next 3 months

## 2022-09-22 ENCOUNTER — OFFICE VISIT (OUTPATIENT)
Dept: FAMILY MEDICINE CLINIC | Facility: CLINIC | Age: 52
End: 2022-09-22

## 2022-09-22 VITALS
OXYGEN SATURATION: 97 % | BODY MASS INDEX: 27.96 KG/M2 | HEART RATE: 74 BPM | WEIGHT: 211 LBS | HEIGHT: 73 IN | RESPIRATION RATE: 16 BRPM | TEMPERATURE: 97 F | DIASTOLIC BLOOD PRESSURE: 78 MMHG | SYSTOLIC BLOOD PRESSURE: 110 MMHG

## 2022-09-22 DIAGNOSIS — E78.00 ELEVATED CHOLESTEROL: ICD-10-CM

## 2022-09-22 DIAGNOSIS — R39.9 LOWER URINARY TRACT SYMPTOMS (LUTS): ICD-10-CM

## 2022-09-22 DIAGNOSIS — E11.9 TYPE 2 DIABETES MELLITUS WITHOUT COMPLICATION, WITHOUT LONG-TERM CURRENT USE OF INSULIN (HCC): Primary | ICD-10-CM

## 2022-09-22 DIAGNOSIS — N48.89 PENILE SWELLING: ICD-10-CM

## 2022-09-22 LAB
CHOLEST SERPL-MCNC: 157 MG/DL (ref ?–200)
EST. AVERAGE GLUCOSE BLD GHB EST-MCNC: 283 MG/DL (ref 68–126)
FASTING PATIENT LIPID ANSWER: YES
HBA1C MFR BLD: 11.5 % (ref ?–5.7)
HDLC SERPL-MCNC: 25 MG/DL (ref 40–59)
LDLC SERPL CALC-MCNC: 114 MG/DL (ref ?–100)
NONHDLC SERPL-MCNC: 132 MG/DL (ref ?–130)
TRIGL SERPL-MCNC: 95 MG/DL (ref 30–149)
VLDLC SERPL CALC-MCNC: 16 MG/DL (ref 0–30)

## 2022-09-22 PROCEDURE — 3008F BODY MASS INDEX DOCD: CPT | Performed by: FAMILY MEDICINE

## 2022-09-22 PROCEDURE — 3078F DIAST BP <80 MM HG: CPT | Performed by: FAMILY MEDICINE

## 2022-09-22 PROCEDURE — 3074F SYST BP LT 130 MM HG: CPT | Performed by: FAMILY MEDICINE

## 2022-09-22 PROCEDURE — 3046F HEMOGLOBIN A1C LEVEL >9.0%: CPT | Performed by: FAMILY MEDICINE

## 2022-09-22 PROCEDURE — 83036 HEMOGLOBIN GLYCOSYLATED A1C: CPT | Performed by: FAMILY MEDICINE

## 2022-09-22 PROCEDURE — 80061 LIPID PANEL: CPT | Performed by: FAMILY MEDICINE

## 2022-09-22 PROCEDURE — 99214 OFFICE O/P EST MOD 30 MIN: CPT | Performed by: FAMILY MEDICINE

## 2022-09-26 ENCOUNTER — TELEPHONE (OUTPATIENT)
Dept: FAMILY MEDICINE CLINIC | Facility: CLINIC | Age: 52
End: 2022-09-26

## 2022-09-26 DIAGNOSIS — E11.65 TYPE 2 DIABETES MELLITUS WITH HYPERGLYCEMIA, WITHOUT LONG-TERM CURRENT USE OF INSULIN (HCC): Primary | ICD-10-CM

## 2022-09-26 NOTE — TELEPHONE ENCOUNTER
Patient notified and verbalized understanding. Is agreeable to ozempic. Requests script to [facility]. Advised can try setting alarm in phone to help remember times to take daily medications as well.     Routed to  to advise on script     [phone]

## 2022-09-26 NOTE — TELEPHONE ENCOUNTER
Exam note received from [facility]  Exam date 9/24/22  No retinopathy noted  flowsheet updated  Report to scanning

## 2022-09-26 NOTE — TELEPHONE ENCOUNTER
----- Message from Racquel Fitzgerald, DO sent at 9/23/2022  3:01 PM CDT -----  Pls let pt know that his A1c is quite high. He needs to try and remember to take his meds all the time. I am also going to add another med as we discussed. I would really like to add a weekly injectible called ozempic because it works well and also helps protect the heart. Let me know if he is agreeable. Cholesterol is also higher than I would like to see it. Please fax results to his cardiologist. Follow up with me in 3 months.

## 2022-09-27 ENCOUNTER — TELEPHONE (OUTPATIENT)
Dept: FAMILY MEDICINE CLINIC | Facility: CLINIC | Age: 52
End: 2022-09-27

## 2022-09-27 DIAGNOSIS — E11.65 TYPE 2 DIABETES MELLITUS WITH HYPERGLYCEMIA, WITHOUT LONG-TERM CURRENT USE OF INSULIN (HCC): Primary | ICD-10-CM

## 2022-09-27 NOTE — TELEPHONE ENCOUNTER
Mckeon wife, Kera Penn, is calling because she has a question about ozempic. The total cost out of pocket is $569.95. They want to know if there is a different medication they could be prescribed.

## 2022-09-28 NOTE — TELEPHONE ENCOUNTER
Spoke with wife, aware of instructions.      Future Appointments   Date Time Provider José Luis Connelly   12/29/2022  8:30 AM Radha Nayak [facility] MICKIE Dougherty

## 2022-09-28 NOTE — TELEPHONE ENCOUNTER
We can try jardiance instead. I sent a script to [facility]  (Lis Nixon is the generic name). It is once daily. Please encourage him to also take his 2nd dose of meds, I think that is in part why his sugars are so out of control. Follow up with me in 3 months. pls schedule appointment.

## 2022-10-10 ENCOUNTER — TELEPHONE (OUTPATIENT)
Dept: FAMILY MEDICINE CLINIC | Facility: CLINIC | Age: 52
End: 2022-10-10

## 2022-10-10 NOTE — TELEPHONE ENCOUNTER
Fax from [address] [place] [address] requesting order for dexcom    Order has been faxec 3 times (see tel enc 9/1/22, 9/8/22 and 9/9/22). Per pharmacist Tyrel Hanson at [facility], order has not been received.  States she can accept verbal.    Verbal given as ordered (see scan in 9/1/22 tel enc)

## 2022-11-18 ENCOUNTER — OFFICE VISIT (OUTPATIENT)
Dept: FAMILY MEDICINE CLINIC | Facility: CLINIC | Age: 52
End: 2022-11-18
Payer: COMMERCIAL

## 2022-11-18 ENCOUNTER — HOSPITAL ENCOUNTER (OUTPATIENT)
Dept: GENERAL RADIOLOGY | Age: 52
Discharge: HOME | End: 2022-11-18
Attending: FAMILY MEDICINE
Payer: COMMERCIAL

## 2022-11-18 VITALS
DIASTOLIC BLOOD PRESSURE: 72 MMHG | WEIGHT: 205 LBS | BODY MASS INDEX: 27 KG/M2 | SYSTOLIC BLOOD PRESSURE: 120 MMHG | TEMPERATURE: 97 F | HEART RATE: 61 BPM | OXYGEN SATURATION: 98 % | RESPIRATION RATE: 16 BRPM

## 2022-11-18 DIAGNOSIS — M25.542 ARTHRALGIA OF LEFT HAND: ICD-10-CM

## 2022-11-18 DIAGNOSIS — E11.65 TYPE 2 DIABETES MELLITUS WITH HYPERGLYCEMIA, WITHOUT LONG-TERM CURRENT USE OF INSULIN (HCC): ICD-10-CM

## 2022-11-18 DIAGNOSIS — M25.542 ARTHRALGIA OF LEFT HAND: Primary | ICD-10-CM

## 2022-11-18 LAB
CRP SERPL-MCNC: <0.29 MG/DL (ref ?–0.3)
URATE SERPL-MCNC: 4.5 MG/DL

## 2022-11-18 PROCEDURE — 3078F DIAST BP <80 MM HG: CPT | Performed by: FAMILY MEDICINE

## 2022-11-18 PROCEDURE — 86140 C-REACTIVE PROTEIN: CPT | Performed by: FAMILY MEDICINE

## 2022-11-18 PROCEDURE — 73130 X-RAY EXAM OF HAND: CPT | Performed by: FAMILY MEDICINE

## 2022-11-18 PROCEDURE — 84550 ASSAY OF BLOOD/URIC ACID: CPT | Performed by: FAMILY MEDICINE

## 2022-11-18 PROCEDURE — 85652 RBC SED RATE AUTOMATED: CPT | Performed by: FAMILY MEDICINE

## 2022-11-18 PROCEDURE — 3074F SYST BP LT 130 MM HG: CPT | Performed by: FAMILY MEDICINE

## 2022-11-18 PROCEDURE — 99214 OFFICE O/P EST MOD 30 MIN: CPT | Performed by: FAMILY MEDICINE

## 2022-11-18 PROCEDURE — 83036 HEMOGLOBIN GLYCOSYLATED A1C: CPT | Performed by: FAMILY MEDICINE

## 2022-11-19 DIAGNOSIS — E11.9 TYPE 2 DIABETES MELLITUS WITHOUT COMPLICATION (HCC): ICD-10-CM

## 2022-11-19 LAB
ERYTHROCYTE [SEDIMENTATION RATE] IN BLOOD: 8 MM/HR
EST. AVERAGE GLUCOSE BLD GHB EST-MCNC: 286 MG/DL (ref 68–126)
HBA1C MFR BLD: 11.6 % (ref ?–5.7)

## 2022-11-19 NOTE — TELEPHONE ENCOUNTER
Diabetic Medication Protocol Failed 11/19/2022 01:54 AM   Protocol Details  Last HgBA1C < 7.5    HgBA1C procedure resulted in past 6 months    Microalbumin procedure in past 12 months or taking ACE/ARB    Appointment in past 6 or next 3 months     Routing to provider per protocol. metFORMIN HCl 1000 MG Oral Tab  Last refilled on 9/20/22 for #60  with 0 rf. Last labs 11/18/22. Last seen on 11/18/22. Future Appointments   Date Time Provider José Luis Connelly   12/29/2022  8:30 AM Radha Nayak, [facility] MICKIE Dougherty          Thank you.

## 2022-11-21 ENCOUNTER — TELEPHONE (OUTPATIENT)
Dept: FAMILY MEDICINE CLINIC | Facility: CLINIC | Age: 52
End: 2022-11-21

## 2022-11-21 NOTE — TELEPHONE ENCOUNTER
----- Message from Didi Mishra DO sent at 11/19/2022 11:24 AM CST -----  Pls call: Armando Wade, A1c is still terrible. So, we can start insulin, which might be more affordable. Or, you can see how much your deductible is and if that is the issue, we can see if we can get you on meds after the first of the year. But, we need to do something to reduce your risk of another heart attack or stroke. Your uric acid level is low, so I think gout is unlikely.  Inflammatory markers are normal. - Dr. Leslie Hernandez

## 2022-11-21 NOTE — TELEPHONE ENCOUNTER
Called and spoke with patient who states he was able to get a coupon for jardiance and is going to see if that works at the pharmacy so he can start now. States he plans on starting ozempic after the first of the year with the new insurance deductible. States he has an HSA and should be able to swing the payment until deductible met.    Advised to let MARTITA know when he starts jardiance and ozempic    MARTITA notified

## 2022-12-19 DIAGNOSIS — E11.9 TYPE 2 DIABETES MELLITUS WITHOUT COMPLICATION (HCC): ICD-10-CM

## 2022-12-19 RX ORDER — GLIPIZIDE 10 MG/1
TABLET, FILM COATED, EXTENDED RELEASE ORAL
Qty: 180 TABLET | Refills: 0 | Status: SHIPPED | OUTPATIENT
Start: 2022-12-19

## 2022-12-19 NOTE — TELEPHONE ENCOUNTER
Diabetic Medication Protocol Failed 12/19/2022 01:24 AM   Protocol Details  Last HgBA1C < 7.5    HgBA1C procedure resulted in past 6 months    Microalbumin procedure in past 12 months or taking ACE/ARB    Appointment in past 6 or next 3 months     Last OV 11/18/22  Last lab 11/18/22 A1c 11.6  Last refilled 8/1/22 #180  0 refill

## 2023-01-18 DIAGNOSIS — E11.9 TYPE 2 DIABETES MELLITUS WITHOUT COMPLICATION (HCC): ICD-10-CM

## 2023-01-21 NOTE — TELEPHONE ENCOUNTER
Left detailed message to voicemail (per verbal release form consent with confirmed identifying message) of Doctor's note below. Patient advised to call office back and schedule f/u appt.

## 2023-02-17 DIAGNOSIS — E11.9 TYPE 2 DIABETES MELLITUS WITHOUT COMPLICATION (HCC): ICD-10-CM

## 2023-02-17 NOTE — TELEPHONE ENCOUNTER
Pt failed refill protocol for the following reasons:    Diabetic Medication Protocol Failed 02/17/2023 01:47 AM   Protocol Details  Last HgBA1C < 7.5         Last refill: 1/19/2023 #60 with 0 refills  Last appt: 11/18/2022  Next appt: No future appointments. Forward to Dr. Kalyan Stephenson, please advise on refills. Thank you.

## 2023-03-19 DIAGNOSIS — E11.9 TYPE 2 DIABETES MELLITUS WITHOUT COMPLICATION (HCC): ICD-10-CM

## 2023-03-22 DIAGNOSIS — E11.9 TYPE 2 DIABETES MELLITUS WITHOUT COMPLICATION (HCC): ICD-10-CM

## 2023-03-23 RX ORDER — GLIPIZIDE 10 MG/1
10 TABLET, FILM COATED, EXTENDED RELEASE ORAL 2 TIMES DAILY
Qty: 180 TABLET | Refills: 0 | Status: SHIPPED | OUTPATIENT
Start: 2023-03-23

## 2023-06-19 DIAGNOSIS — F41.1 GAD (GENERALIZED ANXIETY DISORDER): ICD-10-CM

## 2023-06-20 RX ORDER — SERTRALINE HYDROCHLORIDE 25 MG/1
25 TABLET, FILM COATED ORAL DAILY
Qty: 90 TABLET | Refills: 3 | Status: SHIPPED | OUTPATIENT
Start: 2023-06-20

## 2023-06-26 ENCOUNTER — TELEPHONE (OUTPATIENT)
Dept: FAMILY MEDICINE CLINIC | Facility: CLINIC | Age: 53
End: 2023-06-26

## 2023-06-26 ENCOUNTER — MED REC SCAN ONLY (OUTPATIENT)
Dept: FAMILY MEDICINE CLINIC | Facility: CLINIC | Age: 53
End: 2023-06-26

## 2023-06-26 DIAGNOSIS — E11.65 TYPE 2 DIABETES MELLITUS WITH HYPERGLYCEMIA, WITHOUT LONG-TERM CURRENT USE OF INSULIN (HCC): Primary | ICD-10-CM

## 2023-06-26 NOTE — TELEPHONE ENCOUNTER
Pt is trying to get ozempic but cost too much. Doctor can sent a prior-authorization to get help with insurance. Please advise/help with suggestions. Thank you!

## 2023-06-29 ENCOUNTER — TELEPHONE (OUTPATIENT)
Dept: FAMILY MEDICINE CLINIC | Facility: CLINIC | Age: 53
End: 2023-06-29

## 2023-06-30 ENCOUNTER — TELEPHONE (OUTPATIENT)
Dept: FAMILY MEDICINE CLINIC | Facility: CLINIC | Age: 53
End: 2023-06-30

## 2023-06-30 NOTE — TELEPHONE ENCOUNTER
Patient wife notified he should continue metformin and glipizide for now. Continue to monitor blood sugars. Let office know if going low. Also advised due for appt with MARTITA. Labs will be drawn at that time. Wife will have patient call office to schedule.      Gonzalo ANDERS

## 2023-06-30 NOTE — TELEPHONE ENCOUNTER
Pt wife states pt started ozempic yesterday and is asking if pt should also take the other diabetic medication he is on. Pt wife asked to be one to be called.      LOV: 11/18/22

## 2023-07-05 DIAGNOSIS — E11.9 TYPE 2 DIABETES MELLITUS WITHOUT COMPLICATION (HCC): ICD-10-CM

## 2023-07-05 NOTE — TELEPHONE ENCOUNTER
Pt wife called stating pt only has 1 pill left for tonight and will not be able to take morning dose.

## 2023-07-06 NOTE — TELEPHONE ENCOUNTER
Diabetic Medication Protocol Qbkabh1807/05/2023 03:48 PM   Protocol Details HgBA1C procedure resulted in past 6 months    Last HgBA1C < 7.5    Appointment in past 6 or next 3 months    Microalbumin procedure in past 12 months or taking ACE/ARB     Last OV 11/18/22  Last lab 11/18/22  A1c 11.5    Last refilled 2/17/232/17/23  #60  0 refills

## 2023-07-28 DIAGNOSIS — E11.9 TYPE 2 DIABETES MELLITUS WITHOUT COMPLICATION (HCC): ICD-10-CM

## 2023-07-28 NOTE — TELEPHONE ENCOUNTER
Pt failed refill protocol for the following reasons:      Diabetic Medication Protocol [id number]/28/2023 02:11 PM   Protocol Details HgBA1C procedure resulted in past 6 months    Last HgBA1C < 7.5    Appointment in past 6 or next 3 months          Last refill: 7/06/2023 #60 with 0 refills  Last appt: 11/18/2022  Next appt: No future appointments. Forward to Dr. Rosalie An, please advise on refills. Thank you.

## 2023-09-20 DIAGNOSIS — E11.9 TYPE 2 DIABETES MELLITUS WITHOUT COMPLICATION (HCC): ICD-10-CM

## 2023-09-20 NOTE — TELEPHONE ENCOUNTER
Pt failed refill protocol for the following reasons:  Diabetic Medication Protocol Dhiffk3109/20/2023 09:31 AM   Protocol Details HgBA1C procedure resulted in past 6 months    Last HgBA1C < 7.5    Appointment in past 6 or next 3 months    Microalbumin procedure in past 12 months or taking ACE/ARB       To be filled at: [facility] [address], [phone]                Last refill: 7/28/23  Last appt: 11/18/22  Next appt: No future appointments. Last A1C  9/22/22    Forward to Dr. Leslie Hernandez, please advise on refills. Thank you.

## 2023-12-15 DIAGNOSIS — E11.9 TYPE 2 DIABETES MELLITUS WITHOUT COMPLICATION (HCC): ICD-10-CM

## 2023-12-15 NOTE — TELEPHONE ENCOUNTER
Diabetic Medication Protocol Qhinom92/15/2023 10:47 AM   Protocol Details HgBA1C procedure resulted in past 6 months    Last HgBA1C < 7.5    Appointment in past 6 or next 3 months    Microalbumin procedure in past 12 months or taking ACE/ARB       Glipizide    LOV 11/18/22    Last Refill 3/23/23 #180 Refill 0    Labs 9/22/22    No future appointments. Diabetic Medication Protocol Nhakfp08/15/2023 10:47 AM   Protocol Details HgBA1C procedure resulted in past 6 months    Last HgBA1C < 7.5    Appointment in past 6 or next 3 months    Microalbumin procedure in past 12 months or taking ACE/ARB       Metformin     LOV 11/18/22    Last Refill 9/21/23 #60 Refill 0    Labs 9/22/22    No future appointments.

## 2023-12-15 NOTE — TELEPHONE ENCOUNTER
Patient notified via detailed voicemail left at cell number (ok per  HIPAA consent)  Advised to call office back and schedule  Once scheduled 30 day can be sent

## 2023-12-16 DIAGNOSIS — E11.9 TYPE 2 DIABETES MELLITUS WITHOUT COMPLICATION (HCC): ICD-10-CM

## 2023-12-18 RX ORDER — GLIPIZIDE 10 MG/1
10 TABLET, FILM COATED, EXTENDED RELEASE ORAL 2 TIMES DAILY
Qty: 180 TABLET | Refills: 0 | OUTPATIENT
Start: 2023-12-18

## 2023-12-20 RX ORDER — GLIPIZIDE 10 MG/1
10 TABLET, FILM COATED, EXTENDED RELEASE ORAL 2 TIMES DAILY
Qty: 60 TABLET | Refills: 0 | OUTPATIENT
Start: 2023-12-20

## 2023-12-21 DIAGNOSIS — E11.9 TYPE 2 DIABETES MELLITUS WITHOUT COMPLICATION (HCC): ICD-10-CM

## 2023-12-21 NOTE — TELEPHONE ENCOUNTER
Pt failed refill protocol for the following reasons:    Diabetic Medication Protocol Zcfkql3112/21/2023 06:21 AM   Protocol Details HgBA1C procedure resulted in past 6 months    Last HgBA1C < 7.5    Appointment in past 6 or next 3 months    Microalbumin procedure in past 12 months or taking ACE/ARB            Last refill: 9/21/23  Last appt: 11/18/22  Next appt: No future appointments. Forward to Dr. Latoya Bustos, please advise on refills. Thank you.

## 2024-01-09 DIAGNOSIS — E11.65 TYPE 2 DIABETES MELLITUS WITH HYPERGLYCEMIA, WITHOUT LONG-TERM CURRENT USE OF INSULIN (HCC): ICD-10-CM

## 2024-01-10 NOTE — TELEPHONE ENCOUNTER
Do you want to fill at upcoming appt    Future Appointments   Date Time Provider Department Center   1/12/2024 10:15 AM Elvie Dumont, DO [facility]

## 2024-01-11 RX ORDER — SEMAGLUTIDE 0.68 MG/ML
INJECTION, SOLUTION SUBCUTANEOUS
Qty: 6 ML | Refills: 0 | OUTPATIENT
Start: 2024-01-11

## 2024-01-12 ENCOUNTER — TELEMEDICINE (OUTPATIENT)
Dept: FAMILY MEDICINE CLINIC | Facility: CLINIC | Age: 54
End: 2024-01-12
Payer: COMMERCIAL

## 2024-01-12 DIAGNOSIS — E11.9 TYPE 2 DIABETES MELLITUS WITHOUT COMPLICATION, WITHOUT LONG-TERM CURRENT USE OF INSULIN (HCC): Primary | ICD-10-CM

## 2024-01-12 DIAGNOSIS — I25.10 CORONARY ARTERY DISEASE INVOLVING NATIVE CORONARY ARTERY OF NATIVE HEART WITHOUT ANGINA PECTORIS: ICD-10-CM

## 2024-01-12 LAB
ALBUMIN SERPL-MCNC: 4.1 G/DL (ref 3.4–5)
ALBUMIN/GLOB SERPL: 1.4 {RATIO} (ref 1–2)
ALP LIVER SERPL-CCNC: 103 U/L
ALT SERPL-CCNC: 40 U/L
ANION GAP SERPL CALC-SCNC: 3 MMOL/L (ref 0–18)
AST SERPL-CCNC: 19 U/L (ref 15–37)
BASOPHILS # BLD AUTO: 0.07 X10(3) UL (ref 0–0.2)
BASOPHILS NFR BLD AUTO: 1.3 %
BILIRUB SERPL-MCNC: 0.8 MG/DL (ref 0.1–2)
BUN BLD-MCNC: 9 MG/DL (ref 9–23)
CALCIUM BLD-MCNC: 9.3 MG/DL (ref 8.5–10.1)
CHLORIDE SERPL-SCNC: 105 MMOL/L (ref 98–112)
CHOLEST SERPL-MCNC: 169 MG/DL (ref ?–200)
CO2 SERPL-SCNC: 30 MMOL/L (ref 21–32)
CREAT BLD-MCNC: 1.32 MG/DL
CREAT UR-SCNC: 274 MG/DL
EGFRCR SERPLBLD CKD-EPI 2021: 64 ML/MIN/1.73M2 (ref 60–?)
EOSINOPHIL # BLD AUTO: 0.38 X10(3) UL (ref 0–0.7)
EOSINOPHIL NFR BLD AUTO: 6.8 %
ERYTHROCYTE [DISTWIDTH] IN BLOOD BY AUTOMATED COUNT: 12.5 %
EST. AVERAGE GLUCOSE BLD GHB EST-MCNC: 272 MG/DL (ref 68–126)
FASTING PATIENT LIPID ANSWER: YES
FASTING STATUS PATIENT QL REPORTED: YES
GLOBULIN PLAS-MCNC: 3 G/DL (ref 2.8–4.4)
GLUCOSE BLD-MCNC: 171 MG/DL (ref 70–99)
HBA1C MFR BLD: 11.1 % (ref ?–5.7)
HCT VFR BLD AUTO: 51.6 %
HDLC SERPL-MCNC: 30 MG/DL (ref 40–59)
HGB BLD-MCNC: 17 G/DL
IMM GRANULOCYTES # BLD AUTO: 0.01 X10(3) UL (ref 0–1)
IMM GRANULOCYTES NFR BLD: 0.2 %
LDLC SERPL CALC-MCNC: 122 MG/DL (ref ?–100)
LYMPHOCYTES # BLD AUTO: 2.37 X10(3) UL (ref 1–4)
LYMPHOCYTES NFR BLD AUTO: 42.5 %
MCH RBC QN AUTO: 27.9 PG (ref 26–34)
MCHC RBC AUTO-ENTMCNC: 32.9 G/DL (ref 31–37)
MCV RBC AUTO: 84.7 FL
MICROALBUMIN UR-MCNC: 1.67 MG/DL
MICROALBUMIN/CREAT 24H UR-RTO: 6.1 UG/MG (ref ?–30)
MONOCYTES # BLD AUTO: 0.35 X10(3) UL (ref 0.1–1)
MONOCYTES NFR BLD AUTO: 6.3 %
NEUTROPHILS # BLD AUTO: 2.4 X10 (3) UL (ref 1.5–7.7)
NEUTROPHILS # BLD AUTO: 2.4 X10(3) UL (ref 1.5–7.7)
NEUTROPHILS NFR BLD AUTO: 42.9 %
NONHDLC SERPL-MCNC: 139 MG/DL (ref ?–130)
OSMOLALITY SERPL CALC.SUM OF ELEC: 289 MOSM/KG (ref 275–295)
PLATELET # BLD AUTO: 246 10(3)UL (ref 150–450)
POTASSIUM SERPL-SCNC: 4.5 MMOL/L (ref 3.5–5.1)
PROT SERPL-MCNC: 7.1 G/DL (ref 6.4–8.2)
RBC # BLD AUTO: 6.09 X10(6)UL
SODIUM SERPL-SCNC: 138 MMOL/L (ref 136–145)
TRIGL SERPL-MCNC: 92 MG/DL (ref 30–149)
TSI SER-ACNC: 0.74 MIU/ML (ref 0.36–3.74)
VLDLC SERPL CALC-MCNC: 16 MG/DL (ref 0–30)
WBC # BLD AUTO: 5.6 X10(3) UL (ref 4–11)

## 2024-01-12 PROCEDURE — 82043 UR ALBUMIN QUANTITATIVE: CPT | Performed by: FAMILY MEDICINE

## 2024-01-12 PROCEDURE — 84443 ASSAY THYROID STIM HORMONE: CPT | Performed by: FAMILY MEDICINE

## 2024-01-12 PROCEDURE — 82570 ASSAY OF URINE CREATININE: CPT | Performed by: FAMILY MEDICINE

## 2024-01-12 PROCEDURE — 85025 COMPLETE CBC W/AUTO DIFF WBC: CPT | Performed by: FAMILY MEDICINE

## 2024-01-12 PROCEDURE — 80061 LIPID PANEL: CPT | Performed by: FAMILY MEDICINE

## 2024-01-12 PROCEDURE — 80053 COMPREHEN METABOLIC PANEL: CPT | Performed by: FAMILY MEDICINE

## 2024-01-12 PROCEDURE — 83036 HEMOGLOBIN GLYCOSYLATED A1C: CPT | Performed by: FAMILY MEDICINE

## 2024-01-12 PROCEDURE — 99214 OFFICE O/P EST MOD 30 MIN: CPT | Performed by: FAMILY MEDICINE

## 2024-01-12 RX ORDER — SEMAGLUTIDE 0.68 MG/ML
0.5 INJECTION, SOLUTION SUBCUTANEOUS WEEKLY
Qty: 1 EACH | Refills: 0 | Status: SHIPPED | OUTPATIENT
Start: 2024-01-12

## 2024-01-12 NOTE — PROGRESS NOTES
HPI:  Visit done virtually, him in the office and me at home due to snow storm.    Kemar Muller is a 53 year old male who presents for recheck of his diabetes. Patient’s FBS have been high. Last visit with ophthalmologist was last year.  Pt has been checking his feet on a regular basis. Pt denies any tingling of the feet. Pt denies any issues with depression. Pt complains of issues below.    Hasn't seen eye doc this year.   Hasn't seen cardiology in the past year.     Walking more, daily basis.   Has some back issues lately.   Sugars getting better.   Just started getting on ozempic in Dec 2023. This is 2nd month on it. It was very expensive, like $600/month.   220's at times down into 180's.   Before that was in 260's.   Lost a little bit of weight, just under 200's     Occasional left sided chest pains, still has some soreness from sternotomy. Worse if he's laying on his left side.   Has some heartburn.     Wt Readings from Last 6 Encounters:   11/18/22 205 lb (93 kg)   09/22/22 211 lb (95.7 kg)   08/19/22 205 lb (93 kg)   08/19/22 202 lb (91.6 kg)   04/07/22 205 lb (93 kg)   05/22/21 205 lb 3.2 oz (93.1 kg)     There is no height or weight on file to calculate BMI.     Lab Results   Component Value Date    A1C 11.6 (H) 11/18/2022    A1C 11.5 (H) 09/22/2022    A1C 8.6 (H) 04/07/2022     Lab Results   Component Value Date    CHOLEST 157 09/22/2022    CHOLEST 216 (H) 04/07/2022    CHOLEST 154 01/16/2021     Lab Results   Component Value Date    HDL 25 (L) 09/22/2022    HDL 27 (L) 04/07/2022    HDL 25 (L) 01/16/2021     Lab Results   Component Value Date     (H) 09/22/2022     (H) 04/07/2022     (H) 01/16/2021     Lab Results   Component Value Date    TRIG 95 09/22/2022    TRIG 155 (H) 04/07/2022    TRIG 74 01/16/2021     Lab Results   Component Value Date    AST 25 04/07/2022    AST 21 01/16/2021    AST 22 06/27/2020     Lab Results   Component Value Date    ALT 58 04/07/2022    ALT 39  01/16/2021    ALT 51 06/27/2020     Malb/Cre Calc   Date Value Ref Range Status   04/07/2022 4.4 <=30.0 ug/mg Final     Comment:     <30 ug/mg creatinine       Normal     ug/mg creatinine   Microalbuminuria   >300 ug/mg creatinine      Albuminuria       01/16/2021 4.1 <=30.0 ug/mg Final     Comment:     <30 ug/mg creatinine       Normal     ug/mg creatinine   Microalbuminuria   >300 ug/mg creatinine      Albuminuria       06/27/2020 3.6 <=30.0 ug/mg Final     Comment:     <30 ug/mg creatinine       Normal     ug/mg creatinine   Microalbuminuria   >300 ug/mg creatinine      Albuminuria           Current Outpatient Medications   Medication Sig Dispense Refill    semaglutide (OZEMPIC, 0.25 OR 0.5 MG/DOSE,) 2 MG/3ML Subcutaneous Solution Pen-injector Inject 0.5 mg into the skin once a week. 1 each 0    metFORMIN HCl 1000 MG Oral Tab Take 1 tablet (1,000 mg total) by mouth 2 (two) times daily with meals. 60 tablet 0    sertraline 25 MG Oral Tab Take 1 tablet (25 mg total) by mouth daily. 90 tablet 3    glipiZIDE ER 10 MG Oral Tablet 24 Hr Take 1 tablet (10 mg total) by mouth 2 (two) times daily. 180 tablet 0    empagliflozin 10 MG Oral Tab Take 1 tablet (10 mg total) by mouth daily. (Patient not taking: Reported on 11/18/2022) 90 tablet 0    clotrimazole-betamethasone 1-0.05 % External Cream Apply 1 Application topically 2 (two) times daily. (Patient not taking: Reported on 11/18/2022) 15 g 0    albuterol 108 (90 Base) MCG/ACT Inhalation Aero Soln Inhale 2 puffs into the lungs every 6 (six) hours as needed for Wheezing. 3 each 2    atorvastatin 80 MG Oral Tab Take 80 mg by mouth daily.      Metoprolol Succinate ER 25 MG Oral Tablet 24 Hr TK 1 T PO Q MORNING. SWALLOW WHOLE 90 tablet 0    Losartan Potassium 25 MG Oral Tab TK 1 T PO D  3    aspirin 325 MG Oral Tab Take 325 mg by mouth.        Past Medical History:   Diagnosis Date    Achalasia, esophageal     procedure in 2012    Acute ST elevation  myocardial infarction (STEMI) ([facility]) 10/30/2018    Asthma     Chronic back pain greater than 3 months duration     Coronary artery disease involving native coronary artery of native heart without angina pectoris 10/30/2018    Diabetes ([facility]) 2007    Esophageal reflux     Essential hypertension       Past Surgical History:   Procedure Laterality Date    OTHER  4/12    achalasia procedure, dilation     OTHER SURGICAL HISTORY  2018    bipass      Social History:   Social History     Socioeconomic History    Marital status:    Tobacco Use    Smoking status: Never    Smokeless tobacco: Never   Vaping Use    Vaping Use: Never used   Substance and Sexual Activity    Alcohol use: No     Alcohol/week: 0.0 standard drinks of alcohol     Comment: occ    Drug use: No     Exercise: walking.  Diet: watches minimally     REVIEW OF SYSTEMS:   GENERAL HEALTH: feels well otherwise  SKIN: denies any unusual skin lesions or rashes  RESPIRATORY: denies shortness of breath with exertion  CARDIOVASCULAR: denies chest pain on exertion  GI: denies abdominal pain and denies heartburn  NEURO: denies headaches    EXAM:   There were no vitals taken for this visit.  GENERAL: well developed, well nourished,in no apparent distress  SKIN: no rashes,no suspicious lesions  LUNGS: no distress, speaking in full sentences.   Psych:  normal mood and affect .    ASSESSMENT AND PLAN:   Kemar Muller is a 53 year old male who presents for a recheck of his diabetes. Diabetic control is uncontrolled.  Recommendations are: continue present meds, check HgbA1C, check fasting lipids and CMP, lose wgt with carbohydrate controlled diet and exercise, refer to Ophthamology, check feet daily.  The patient indicates understanding of these issues and agrees to the plan.  The patient is asked to return in 3 months or sooner if not feeling well.     Encounter Diagnoses   Name Primary?    Type 2 diabetes mellitus without complication, without long-term current  use of insulin (HCC) Yes    Coronary artery disease involving native coronary artery of native heart without angina pectoris      - increase ozempic to higher dose. Hopefully with new insurance he can afford it.   - follow up with cardiology.   - follow up with eye doctor.   - labs today as ordered.     Orders Placed This Encounter   Procedures    CBC With Differential With Platelet    Comp Metabolic Panel (14)    Hemoglobin A1C    Lipid Panel    Microalb/Creat Ratio, Random Urine    TSH W Reflex To Free T4    VENIPUNCTURE       Meds & Refills for this Visit:  Requested Prescriptions     Signed Prescriptions Disp Refills    semaglutide (OZEMPIC, 0.25 OR 0.5 MG/DOSE,) 2 MG/3ML Subcutaneous Solution Pen-injector 1 each 0     Sig: Inject 0.5 mg into the skin once a week.       Imaging & Consults:  None

## 2024-01-17 ENCOUNTER — TELEPHONE (OUTPATIENT)
Dept: FAMILY MEDICINE CLINIC | Facility: CLINIC | Age: 54
End: 2024-01-17

## 2024-01-17 NOTE — TELEPHONE ENCOUNTER
Kemar, your sugars are high as we expected. Lets increase the ozempic, then increase again in a month if we can. Cut back on sugars. Let me know if ozempic is too expensive. Kidney function has declined slightly. Cholesterol is too high, make sure you are taking the cholesterol medicine and follow up with cardiology. You need to start taking your health more seriously to avoid a bad outcome like another heart attack or stroke. Follow up with me in 3 months.  - Dr. Dumont   Written by Elvie Dumont DO on 1/15/2024  5:03 PM CST

## 2024-01-17 NOTE — TELEPHONE ENCOUNTER
Pt unable to return calls so wife called back for him.  Spouse believes it is for test results.  Please return spouse's call.  Thank you!

## 2024-01-17 NOTE — TELEPHONE ENCOUNTER
Left detailed message to voicemail (per verbal release form consent with confirmed identifying message) of Dr. Dumont's note below. Patient's spouse was advised to call office back with any questions/concerns.

## 2024-01-22 ENCOUNTER — MED REC SCAN ONLY (OUTPATIENT)
Dept: FAMILY MEDICINE CLINIC | Facility: CLINIC | Age: 54
End: 2024-01-22

## 2024-01-22 DIAGNOSIS — E11.9 TYPE 2 DIABETES MELLITUS WITHOUT COMPLICATION (HCC): ICD-10-CM

## 2024-02-13 DIAGNOSIS — E11.9 TYPE 2 DIABETES MELLITUS WITHOUT COMPLICATION, WITHOUT LONG-TERM CURRENT USE OF INSULIN (HCC): ICD-10-CM

## 2024-02-13 NOTE — TELEPHONE ENCOUNTER
Pt failed refill protocol for the following reasons:   Name from pharmacy: Ozempic (0.25 or 0.5 MG/DOSE) 2 MG/3ML Subcutaneous Solution Pen-injector         Will file in chart as: OZEMPIC, 0.25 OR 0.5 MG/DOSE, 2 MG/3ML Subcutaneous Solution Pen-injector    Sig: INJECT 0.5MG INTO THE SKIN ONCE A WEEK.    Disp: 3 mL    Refills: 0    Start: 2/13/2024    Class: Normal    Non-formulary For: Type 2 diabetes mellitus without complication, without long-term current use of insulin (HCC)    Last ordered: 1 month ago (1/12/2024) by Elvie Dumont DO    Last refill: 1/12/2024    Rx #: [id number]    Diabetes Medication Protocol [id number]/13/2024 12:58 PM   Protocol Details Last A1C < 7.5 and within past 6 months    In person appointment or virtual visit in the past 6 mos or appointment in next 3 mos    Microalbumin procedure in past 12 months or taking ACE/ARB    EGFRCR or GFRNAA > 50    GFR in the past 12 months      To be filled at: [place] [address] [phone], [phone]         Last refill: 1/12/24  Last appt: 11/18/22  Next appt: No future appointments.      Forward to Dr. Dumont, please advise on refills. Thank you.

## 2024-02-14 NOTE — TELEPHONE ENCOUNTER
If he is tolerating the 0.5 mg dose, then we need to increase to 1 mg.     Script pended.     Due to follow up with me for repeat A1c in April, please schedule him to see me.

## 2024-02-14 NOTE — TELEPHONE ENCOUNTER
Pt is completely out of medication.  Can we send in script?    [address] - [address] [phone], [phone]   [address] [facility] [address]   Phone: [phone] Fax: [phone]

## 2024-02-15 NOTE — TELEPHONE ENCOUNTER
See note below. Call pt. I am increasing the dose. He is due for follow up with me. That's why it wasn't sent, I was waiting to hear back from them.

## 2024-02-25 DIAGNOSIS — E11.9 TYPE 2 DIABETES MELLITUS WITHOUT COMPLICATION (HCC): ICD-10-CM

## 2024-02-26 RX ORDER — GLIPIZIDE 10 MG/1
10 TABLET, FILM COATED, EXTENDED RELEASE ORAL 2 TIMES DAILY
Qty: 180 TABLET | Refills: 0 | Status: SHIPPED | OUTPATIENT
Start: 2024-02-26

## 2024-02-26 NOTE — TELEPHONE ENCOUNTER
Last OV 1/12/24 telemed, 11/18/22 office  Last lab 1/12/24 A1c 11.1  Last refilled 3/23/23  #90  0 refill

## 2024-03-12 ENCOUNTER — TELEPHONE (OUTPATIENT)
Dept: FAMILY MEDICINE CLINIC | Facility: CLINIC | Age: 54
End: 2024-03-12

## 2024-03-15 ENCOUNTER — PATIENT OUTREACH (OUTPATIENT)
Dept: CASE MANAGEMENT | Age: 54
End: 2024-03-15

## 2024-03-15 NOTE — PROCEDURES
The office order for PCP removal request is Approved and finalized on March 15, 2024.    Thanks,  [facility] Team

## 2024-04-27 DIAGNOSIS — E11.9 TYPE 2 DIABETES MELLITUS WITHOUT COMPLICATION, WITHOUT LONG-TERM CURRENT USE OF INSULIN (HCC): ICD-10-CM

## 2024-04-30 NOTE — TELEPHONE ENCOUNTER
Left message on voicemail/answering machine for patient to call office to schedule f/u with Dr Dumont

## 2024-05-26 DIAGNOSIS — E11.9 TYPE 2 DIABETES MELLITUS WITHOUT COMPLICATION (HCC): ICD-10-CM

## 2024-05-28 RX ORDER — GLIPIZIDE 10 MG/1
10 TABLET, FILM COATED, EXTENDED RELEASE ORAL 2 TIMES DAILY
Qty: 180 TABLET | Refills: 0 | OUTPATIENT
Start: 2024-05-28

## 2024-06-13 DIAGNOSIS — E11.9 TYPE 2 DIABETES MELLITUS WITHOUT COMPLICATION, WITHOUT LONG-TERM CURRENT USE OF INSULIN (HCC): ICD-10-CM

## 2024-06-13 NOTE — TELEPHONE ENCOUNTER
SPOUSE CALLED AND ADV PT NEEDS REFILL OF       semaglutide 8 MG/3ML Subcutaneous Solution Pen-injector     PLEASE SEND TO [facility]     WAS ADV THAT PHARMACY SENT OVER A PA - THAT PT NEEDS    ** DID SPEAK W/SPOUSE ABOUT PT NO LONGER BEING SEEN HER, SAID THAT HE HASN'T FOUND ANOTHER PCP YET **      PLEASE ADV    THANK YOU

## 2024-06-13 NOTE — TELEPHONE ENCOUNTER
Per Dr Dumont, if needs refills will need to be seen    Wife notified and verbalized understanding.  Future Appointments   Date Time Provider Department Center   6/14/2024  1:30 PM Elvie Dumont DO [facility] [place]

## 2024-06-13 NOTE — TELEPHONE ENCOUNTER
Per 3/12/24 telephone encounter patient is no longer seeing Dr Dumont.  Patient has not found new PC and is asking for refill.  Please advise

## 2024-06-14 ENCOUNTER — NURSE ONLY (OUTPATIENT)
Dept: FAMILY MEDICINE CLINIC | Facility: CLINIC | Age: 54
End: 2024-06-14
Payer: COMMERCIAL

## 2024-06-14 ENCOUNTER — OFFICE VISIT (OUTPATIENT)
Dept: FAMILY MEDICINE CLINIC | Facility: CLINIC | Age: 54
End: 2024-06-14
Payer: COMMERCIAL

## 2024-06-14 VITALS
DIASTOLIC BLOOD PRESSURE: 78 MMHG | HEIGHT: 72 IN | TEMPERATURE: 98 F | WEIGHT: 204 LBS | HEART RATE: 81 BPM | RESPIRATION RATE: 16 BRPM | BODY MASS INDEX: 27.63 KG/M2 | OXYGEN SATURATION: 98 % | SYSTOLIC BLOOD PRESSURE: 104 MMHG

## 2024-06-14 DIAGNOSIS — Z00.00 HEALTHY ADULT ON ROUTINE PHYSICAL EXAMINATION: Primary | ICD-10-CM

## 2024-06-14 DIAGNOSIS — I25.10 CORONARY ARTERY DISEASE INVOLVING NATIVE CORONARY ARTERY OF NATIVE HEART WITHOUT ANGINA PECTORIS: ICD-10-CM

## 2024-06-14 DIAGNOSIS — E11.65 TYPE 2 DIABETES MELLITUS WITH HYPERGLYCEMIA, WITHOUT LONG-TERM CURRENT USE OF INSULIN (HCC): ICD-10-CM

## 2024-06-14 DIAGNOSIS — E11.9 TYPE 2 DIABETES MELLITUS WITHOUT COMPLICATION, WITHOUT LONG-TERM CURRENT USE OF INSULIN (HCC): ICD-10-CM

## 2024-06-14 DIAGNOSIS — Z12.5 SCREENING FOR MALIGNANT NEOPLASM OF PROSTATE: ICD-10-CM

## 2024-06-14 PROBLEM — N40.1 ENLARGED PROSTATE WITH LOWER URINARY TRACT SYMPTOMS (LUTS): Status: ACTIVE | Noted: 2023-09-26

## 2024-06-14 LAB
ALBUMIN SERPL-MCNC: 4.2 G/DL (ref 3.4–5)
ALBUMIN/GLOB SERPL: 1.2 {RATIO} (ref 1–2)
ALP LIVER SERPL-CCNC: 89 U/L
ALT SERPL-CCNC: 38 U/L
ANION GAP SERPL CALC-SCNC: 6 MMOL/L (ref 0–18)
AST SERPL-CCNC: 28 U/L (ref 15–37)
BASOPHILS # BLD AUTO: 0.08 X10(3) UL (ref 0–0.2)
BASOPHILS NFR BLD AUTO: 1.2 %
BILIRUB SERPL-MCNC: 0.6 MG/DL (ref 0.1–2)
BUN BLD-MCNC: 13 MG/DL (ref 9–23)
CALCIUM BLD-MCNC: 9.3 MG/DL (ref 8.5–10.1)
CHLORIDE SERPL-SCNC: 106 MMOL/L (ref 98–112)
CHOLEST SERPL-MCNC: 157 MG/DL (ref ?–200)
CO2 SERPL-SCNC: 26 MMOL/L (ref 21–32)
COMPLEXED PSA SERPL-MCNC: 1.16 NG/ML (ref ?–4)
CREAT BLD-MCNC: 1.26 MG/DL
CREAT UR-SCNC: 224 MG/DL
EGFRCR SERPLBLD CKD-EPI 2021: 67 ML/MIN/1.73M2 (ref 60–?)
EOSINOPHIL # BLD AUTO: 0.36 X10(3) UL (ref 0–0.7)
EOSINOPHIL NFR BLD AUTO: 5.5 %
ERYTHROCYTE [DISTWIDTH] IN BLOOD BY AUTOMATED COUNT: 12.6 %
EST. AVERAGE GLUCOSE BLD GHB EST-MCNC: 186 MG/DL (ref 68–126)
FASTING PATIENT LIPID ANSWER: NO
FASTING STATUS PATIENT QL REPORTED: NO
GLOBULIN PLAS-MCNC: 3.5 G/DL (ref 2.8–4.4)
GLUCOSE BLD-MCNC: 99 MG/DL (ref 70–99)
HBA1C MFR BLD: 8.1 % (ref ?–5.7)
HCT VFR BLD AUTO: 46.3 %
HDLC SERPL-MCNC: 35 MG/DL (ref 40–59)
HGB BLD-MCNC: 15.9 G/DL
IMM GRANULOCYTES # BLD AUTO: 0.01 X10(3) UL (ref 0–1)
IMM GRANULOCYTES NFR BLD: 0.2 %
LDLC SERPL CALC-MCNC: 102 MG/DL (ref ?–100)
LYMPHOCYTES # BLD AUTO: 2.85 X10(3) UL (ref 1–4)
LYMPHOCYTES NFR BLD AUTO: 43.8 %
MCH RBC QN AUTO: 29.2 PG (ref 26–34)
MCHC RBC AUTO-ENTMCNC: 34.3 G/DL (ref 31–37)
MCV RBC AUTO: 85 FL
MICROALBUMIN UR-MCNC: 0.71 MG/DL
MICROALBUMIN/CREAT 24H UR-RTO: 3.2 UG/MG (ref ?–30)
MONOCYTES # BLD AUTO: 0.45 X10(3) UL (ref 0.1–1)
MONOCYTES NFR BLD AUTO: 6.9 %
NEUTROPHILS # BLD AUTO: 2.75 X10 (3) UL (ref 1.5–7.7)
NEUTROPHILS # BLD AUTO: 2.75 X10(3) UL (ref 1.5–7.7)
NEUTROPHILS NFR BLD AUTO: 42.4 %
NONHDLC SERPL-MCNC: 122 MG/DL (ref ?–130)
OSMOLALITY SERPL CALC.SUM OF ELEC: 286 MOSM/KG (ref 275–295)
PLATELET # BLD AUTO: 261 10(3)UL (ref 150–450)
POTASSIUM SERPL-SCNC: 4.6 MMOL/L (ref 3.5–5.1)
PROT SERPL-MCNC: 7.7 G/DL (ref 6.4–8.2)
RBC # BLD AUTO: 5.45 X10(6)UL
SODIUM SERPL-SCNC: 138 MMOL/L (ref 136–145)
TRIGL SERPL-MCNC: 106 MG/DL (ref 30–149)
VLDLC SERPL CALC-MCNC: 18 MG/DL (ref 0–30)
WBC # BLD AUTO: 6.5 X10(3) UL (ref 4–11)

## 2024-06-14 PROCEDURE — 82043 UR ALBUMIN QUANTITATIVE: CPT | Performed by: FAMILY MEDICINE

## 2024-06-14 PROCEDURE — 3046F HEMOGLOBIN A1C LEVEL >9.0%: CPT | Performed by: FAMILY MEDICINE

## 2024-06-14 PROCEDURE — 92229 IMG RTA DETC/MNTR DS POC ALY: CPT | Performed by: FAMILY MEDICINE

## 2024-06-14 PROCEDURE — 3078F DIAST BP <80 MM HG: CPT | Performed by: FAMILY MEDICINE

## 2024-06-14 PROCEDURE — 85025 COMPLETE CBC W/AUTO DIFF WBC: CPT | Performed by: FAMILY MEDICINE

## 2024-06-14 PROCEDURE — 3074F SYST BP LT 130 MM HG: CPT | Performed by: FAMILY MEDICINE

## 2024-06-14 PROCEDURE — 99396 PREV VISIT EST AGE 40-64: CPT | Performed by: FAMILY MEDICINE

## 2024-06-14 PROCEDURE — 80053 COMPREHEN METABOLIC PANEL: CPT | Performed by: FAMILY MEDICINE

## 2024-06-14 PROCEDURE — 90677 PCV20 VACCINE IM: CPT | Performed by: FAMILY MEDICINE

## 2024-06-14 PROCEDURE — 3061F NEG MICROALBUMINURIA REV: CPT | Performed by: FAMILY MEDICINE

## 2024-06-14 PROCEDURE — 3008F BODY MASS INDEX DOCD: CPT | Performed by: FAMILY MEDICINE

## 2024-06-14 PROCEDURE — 82570 ASSAY OF URINE CREATININE: CPT | Performed by: FAMILY MEDICINE

## 2024-06-14 PROCEDURE — 84153 ASSAY OF PSA TOTAL: CPT | Performed by: FAMILY MEDICINE

## 2024-06-14 PROCEDURE — 90471 IMMUNIZATION ADMIN: CPT | Performed by: FAMILY MEDICINE

## 2024-06-14 PROCEDURE — 83036 HEMOGLOBIN GLYCOSYLATED A1C: CPT | Performed by: FAMILY MEDICINE

## 2024-06-14 PROCEDURE — 80061 LIPID PANEL: CPT | Performed by: FAMILY MEDICINE

## 2024-06-14 RX ORDER — TAMSULOSIN HYDROCHLORIDE 0.4 MG/1
0.4 CAPSULE ORAL DAILY
COMMUNITY

## 2024-06-14 NOTE — PROGRESS NOTES
HPI:   Kemar Muller is a 54 year old male who presents for recheck of his diabetes. Patient’s FBS have been 150-180's, used to be 270's. Feels like he gets low, when he checks it's 105-110. He hasn't seen anything lower than 100. Last visit with ophthalmologist was Sept 2022, but he thinks he went last year also.  Pt has been checking his feet on a regular basis. Pt denies any tingling of the feet. Pt denies any issues with depression. Pt complains of nothing new.    Sugars are better with ozempic 2 mg weekly. No side effects other than mild nausea with first dose. Doesn't feel hungry all the time.needs a refill.   Does a lot of walking, working at [employer].     Still taking sertraline. Felt more irritable when he was out of it for a while.     Taking albuterol rarely, when he is.     Following with cardiology yearly.   Scheduled to see GI for colonoscopy.     Wt Readings from Last 6 Encounters:   06/14/24 204 lb (92.5 kg)   11/18/22 205 lb (93 kg)   09/22/22 211 lb (95.7 kg)   08/19/22 205 lb (93 kg)   08/19/22 202 lb (91.6 kg)   04/07/22 205 lb (93 kg)     Body mass index is 27.67 kg/m².     Lab Results   Component Value Date    A1C 11.1 (H) 01/12/2024    A1C 11.6 (H) 11/18/2022    A1C 11.5 (H) 09/22/2022     Lab Results   Component Value Date    CHOLEST 169 01/12/2024    CHOLEST 157 09/22/2022    CHOLEST 216 (H) 04/07/2022     Lab Results   Component Value Date    HDL 30 (L) 01/12/2024    HDL 25 (L) 09/22/2022    HDL 27 (L) 04/07/2022     Lab Results   Component Value Date     (H) 01/12/2024     (H) 09/22/2022     (H) 04/07/2022     Lab Results   Component Value Date    TRIG 92 01/12/2024    TRIG 95 09/22/2022    TRIG 155 (H) 04/07/2022     Lab Results   Component Value Date    AST 19 01/12/2024    AST 25 04/07/2022    AST 21 01/16/2021     Lab Results   Component Value Date    ALT 40 01/12/2024    ALT 58 04/07/2022    ALT 39 01/16/2021     Malb/Cre Calc   Date Value Ref Range Status    01/12/2024 6.1 <=30.0 ug/mg Final     Comment:     <30 ug/mg creatinine       Normal     ug/mg creatinine   Microalbuminuria   >300 ug/mg creatinine      Albuminuria       04/07/2022 4.4 <=30.0 ug/mg Final     Comment:     <30 ug/mg creatinine       Normal     ug/mg creatinine   Microalbuminuria   >300 ug/mg creatinine      Albuminuria       01/16/2021 4.1 <=30.0 ug/mg Final     Comment:     <30 ug/mg creatinine       Normal     ug/mg creatinine   Microalbuminuria   >300 ug/mg creatinine      Albuminuria           Current Outpatient Medications   Medication Sig Dispense Refill    semaglutide 8 MG/3ML Subcutaneous Solution Pen-injector Inject 2 mg into the skin once a week. 9 each 0    glipiZIDE ER 10 MG Oral Tablet 24 Hr Take 1 tablet (10 mg total) by mouth 2 (two) times daily. 180 tablet 0    metFORMIN HCl 1000 MG Oral Tab Take 1 tablet (1,000 mg total) by mouth 2 (two) times daily with meals. 180 tablet 0    sertraline 25 MG Oral Tab Take 1 tablet (25 mg total) by mouth daily. 90 tablet 3    albuterol 108 (90 Base) MCG/ACT Inhalation Aero Soln Inhale 2 puffs into the lungs every 6 (six) hours as needed for Wheezing. 3 each 2    atorvastatin 80 MG Oral Tab Take 1 tablet (80 mg total) by mouth daily.      Metoprolol Succinate ER 25 MG Oral Tablet 24 Hr TK 1 T PO Q MORNING. SWALLOW WHOLE 90 tablet 0    Losartan Potassium 25 MG Oral Tab TK 1 T PO D  3    aspirin 325 MG Oral Tab Take 1 tablet (325 mg total) by mouth.      tamsulosin 0.4 MG Oral Cap Take 1 capsule (0.4 mg total) by mouth daily.        Past Medical History:    Achalasia, esophageal    procedure in 2012    Acute ST elevation myocardial infarction (STEMI) ([facility])    Asthma ([facility])    Chronic back pain greater than 3 months duration    Coronary artery disease involving native coronary artery of native heart without angina pectoris    Diabetes (HCC)    Esophageal reflux    Essential hypertension      Past Surgical History:   Procedure  Laterality Date    Other  4/12    achalasia procedure, dilation     Other surgical history  2018    bipass      Social History:   Social History     Socioeconomic History    Marital status:    Tobacco Use    Smoking status: Never    Smokeless tobacco: Never   Vaping Use    Vaping status: Never Used   Substance and Sexual Activity    Alcohol use: Yes     Comment: occ    Drug use: Yes     Types: Cannabis     Social Determinants of Health      [facility], [facility]    Social Connections    Received from [facility], [facility]    Housing Stability     Exercise: walking.  Diet: watches minimally     REVIEW OF SYSTEMS:   GENERAL HEALTH: feels well otherwise, no new concerns.   SKIN: denies any unusual skin lesions or rashes  RESPIRATORY: denies shortness of breath with exertion  CARDIOVASCULAR: denies chest pain on exertion  GI: denies abdominal pain and denies heartburn  NEURO: denies headaches    EXAM:   /78   Pulse 81   Temp 97.6 °F (36.4 °C)   Resp 16   Ht 6' (1.829 m)   Wt 204 lb (92.5 kg)   SpO2 98%   BMI 27.67 kg/m²   GENERAL: well developed, well nourished,in no apparent distress  SKIN: no rashes,no suspicious lesions  NECK: supple,no adenopathy,   LUNGS: clear to auscultation  CARDIO: RRR without murmur  GI: good BS's,no masses, HSM or tenderness  EXTREMITIES: no cyanosis, clubbing or edema  Bilateral barefoot skin diabetic exam is normal, visualized feet and the appearance is normal.  Bilateral monofilament/sensation of both feet is normal.  Pulsation pedal pulse exam of both lower legs/feet is normal as well.      ASSESSMENT AND PLAN:   Kemar Muller is a 54 year old male who presents for a recheck of his diabetes. Diabetic control is improving.  Recommendations are: continue present meds, check HgbA1C, check fasting lipids and CMP, lose wgt with carbohydrate controlled diet and exercise,  refer to Ophthamology, check feet daily.  The patient indicates understanding of these issues and agrees to the plan.  The patient is asked to return in 3 months or sooner if needed.     Encounter Diagnoses   Name Primary?    Healthy adult on routine physical examination Yes    Type 2 diabetes mellitus with hyperglycemia, without long-term current use of insulin (HCC)     Screening for malignant neoplasm of prostate     Type 2 diabetes mellitus without complication, without long-term current use of insulin (HCC)     Coronary artery disease involving native coronary artery of native heart without angina pectoris    - refilled ozempic. Continue current meds. Depending on A1c might need to add januvia or jardiance?   - eye exam today.   - he is scheduled for GI consult for colonoscopy.     Orders Placed This Encounter   Procedures    Comp Metabolic Panel (14)    CBC With Differential With Platelet    Microalb/Creat Ratio, Random Urine    Hemoglobin A1C    Lipid Panel    PSA Total, Screen    PCV20 (Prevnar 20)    Diabetic Retinopathy Exam  OU - Both Eyes    VENIPUNCTURE       Meds & Refills for this Visit:  Requested Prescriptions     Signed Prescriptions Disp Refills    semaglutide 8 MG/3ML Subcutaneous Solution Pen-injector 9 each 0     Sig: Inject 2 mg into the skin once a week.       Imaging & Consults:  PCV20 VACCINE FOR INTRAMUSCULAR USE

## 2024-06-17 ENCOUNTER — TELEPHONE (OUTPATIENT)
Dept: FAMILY MEDICINE CLINIC | Facility: CLINIC | Age: 54
End: 2024-06-17

## 2024-06-17 DIAGNOSIS — E11.65 TYPE 2 DIABETES MELLITUS WITH HYPERGLYCEMIA, WITHOUT LONG-TERM CURRENT USE OF INSULIN (HCC): Primary | ICD-10-CM

## 2024-06-17 NOTE — TELEPHONE ENCOUNTER
Need to add another med for DM. A1c still not under control     Script sent for farxiga.  Can continue all other medicines, including the ozempic.   Let us know if feeling not well, abd pain, GI symptoms, UTI symptoms or other changes after starting this med.     Follow up with me in 3 months.

## 2024-06-18 RX ORDER — DAPAGLIFLOZIN 5 MG/1
5 TABLET, FILM COATED ORAL DAILY
Qty: 90 TABLET | Refills: 0 | Status: SHIPPED | OUTPATIENT
Start: 2024-06-18

## 2024-06-25 ENCOUNTER — TELEPHONE (OUTPATIENT)
Dept: FAMILY MEDICINE CLINIC | Facility: CLINIC | Age: 54
End: 2024-06-25

## 2024-06-25 DIAGNOSIS — F41.1 GAD (GENERALIZED ANXIETY DISORDER): ICD-10-CM

## 2024-06-25 RX ORDER — SERTRALINE HYDROCHLORIDE 25 MG/1
25 TABLET, FILM COATED ORAL DAILY
Qty: 30 TABLET | Refills: 0 | Status: SHIPPED | OUTPATIENT
Start: 2024-06-25

## 2024-06-25 NOTE — TELEPHONE ENCOUNTER
Psychiatric Non-Scheduled (Anti-Anxiety) [id number]/25/2024 11:05 AM   Protocol Details In person appointment or virtual visit in the past 6 mos or appointment in next 3 mos    Depression Screening completed within the past 12 months        LOV 6/14/24      Last Refill  Medication Quantity Refills Start End   sertraline 25 MG Oral Tab 90 tablet 3 6/20/2023        No future appointments.

## 2024-08-16 DIAGNOSIS — F41.1 GAD (GENERALIZED ANXIETY DISORDER): ICD-10-CM

## 2024-08-16 RX ORDER — SERTRALINE HYDROCHLORIDE 25 MG/1
25 TABLET, FILM COATED ORAL DAILY
Qty: 30 TABLET | Refills: 0 | Status: SHIPPED | OUTPATIENT
Start: 2024-08-16

## 2024-08-16 NOTE — TELEPHONE ENCOUNTER
Psychiatric Non-Scheduled (Anti-Anxiety) [id number]/16/2024 06:24 AM   Protocol Details In person appointment or virtual visit in the past 6 mos or appointment in next 3 mos    Depression Screening completed within the past 12 months          LOV 6/14/24     Last Refill   Medication Quantity Refills Start End   SERTRALINE 25 MG Oral Tab 30 tablet 0 6/25/202        No future appointments.

## 2024-09-16 DIAGNOSIS — E11.9 TYPE 2 DIABETES MELLITUS WITHOUT COMPLICATION, WITHOUT LONG-TERM CURRENT USE OF INSULIN (HCC): ICD-10-CM

## 2024-09-16 DIAGNOSIS — F41.1 GAD (GENERALIZED ANXIETY DISORDER): ICD-10-CM

## 2024-09-16 RX ORDER — SERTRALINE HYDROCHLORIDE 25 MG/1
25 TABLET, FILM COATED ORAL DAILY
Qty: 30 TABLET | Refills: 0 | Status: SHIPPED | OUTPATIENT
Start: 2024-09-16

## 2024-09-16 NOTE — TELEPHONE ENCOUNTER
Patient's name and  verified   Patient's wife (on Verbal form) she will have patient call back and schedule   Patient notified and verbalized an understanding

## 2024-09-16 NOTE — TELEPHONE ENCOUNTER
Last OV:06/14/2024 physical   Last refill:06/14/2024, 9 each, 0 refill     Medication pended, please sign if appropriate

## 2024-09-16 NOTE — TELEPHONE ENCOUNTER
Psychiatric Non-Scheduled (Anti-Anxiety) [id number]/16/2024 06:23 AM   Protocol Details In person appointment or virtual visit in the past 6 mos or appointment in next 3 mos    Depression Screening completed within the past 12 months       LOV 6/14/24     Last Refill  Medication Quantity Refills Start End   SERTRALINE 25 MG Oral Tab 30 tablet 0 8/16/2024        No future appointments.

## 2024-09-16 NOTE — TELEPHONE ENCOUNTER
SPOUSE CALLED AND ADV PT NEEDS REFILL OF     semaglutide 8 MG/3ML Subcutaneous Solution Pen-injector     PLEASE SEND TO [facility]     THANK YOU

## 2024-09-17 DIAGNOSIS — E11.65 TYPE 2 DIABETES MELLITUS WITH HYPERGLYCEMIA, WITHOUT LONG-TERM CURRENT USE OF INSULIN (HCC): ICD-10-CM

## 2024-09-17 RX ORDER — DAPAGLIFLOZIN 5 MG/1
5 TABLET, FILM COATED ORAL DAILY
Qty: 90 TABLET | Refills: 0 | Status: SHIPPED | OUTPATIENT
Start: 2024-09-17

## 2024-09-17 NOTE — TELEPHONE ENCOUNTER
Diabetes Medication Protocol [id number]/17/2024 09:59 AM   Protocol Details Last A1C < 7.5 and within past 6 months    In person appointment or virtual visit in the past 6 mos or appointment in next 3 mos    Microalbumin procedure in past 12 months or taking ACE/ARB    EGFRCR or GFRNAA > 50    GFR in the past 12 months       LOV 6/14/24     Last Refill   Medication Quantity Refills Start End   dapagliflozin (FARXIGA) 5 MG Oral Tab 90 tablet 0 6/18/2024        Labs 6/14/24    No future appointments.

## 2024-10-10 NOTE — TELEPHONE ENCOUNTER
Impression:   56 years old male with h/o chronic ETOH abuse and dependence (1 bottle of Vodka a day) with long standing hx of alcohol withdrawal and DTs with frequent hospital/ICU admissions, alcoholic gastritis/esophagitis, PUD, HLD, hx of hypoxic respiratory failure requiring intubation due to aspiration PNA (most recent intubation Aril 2020),  staph PNA, COVID-19 infection Dec 2020 presented with abd pain at the OSH.    #Acute alcohol associated hepatitis and cirrhosis   #Alcohol-related liver disease  #Hepatic steatosis  Patient with multiple hospitalizations in the past for alcohol withdrawal and DT per records. Unclear last alcohol drink. he was admitted with AMS and intubated with findings of cerebral edema and concern for GUNJAN. Now extubated on 9/23 with improvement in mental status s/p Plex x3, mannitol and hypertonic saline.    High MDF score of 49  - Calculated MELD 3.0 score 34 >>27  - Ascites: s/p paracentesis 10/1 with 800cc fluid removal, SAAG > 1.1, Total protein 2.5, negative for SBP  - Varices: no EGD on file  - HE: now improved with medical therapy   - HCC: CT without lesion (not triple phase study)    Recommendations:   - Ongoing OLT evaluation   - Dobutamine stress echo today  - Hydroxyzine 25 mg TID PRN pruritis   - Increase cholestyramine to 4 grams TID   - Rifaximin 550 mg BID, lactulose to goal 3-4 BMs per day.   - Low sodium diet  - Thiamine, FA, MV supplementation  - Alcohol cessation counseling  - Appreciate input from Psychiatry    - PT/OT/Nutrition   - Trend CBC, CMP & PT/INR daily    Mayur Castillo MD  Gastroenterology/Hepatology Fellow [facility] states they moved further away from office and is est care with another provider. Office fax provided so new pcp can request medical records.     Impression:   56 years old male with h/o chronic ETOH abuse and dependence (1 bottle of Vodka a day) with long standing hx of alcohol withdrawal and DTs with frequent hospital/ICU admissions, alcoholic gastritis/esophagitis, PUD, HLD, hx of hypoxic respiratory failure requiring intubation due to aspiration PNA (most recent intubation Aril 2020),  staph PNA, COVID-19 infection Dec 2020 presented with abd pain at the OSH.    #Acute alcohol associated hepatitis and cirrhosis   #Alcohol-related liver disease  #Hepatic steatosis  Patient with multiple hospitalizations in the past for alcohol withdrawal and DT per records. Unclear last alcohol drink. he was admitted with AMS and intubated with findings of cerebral edema and concern for GUNJAN. Now extubated on 9/23 with improvement in mental status s/p Plex x3, mannitol and hypertonic saline.    High MDF score of 49  - Calculated MELD 3.0 score 34 >>27  - Ascites: s/p paracentesis 10/1 with 800cc fluid removal, SAAG > 1.1, Total protein 2.5, negative for SBP  - Varices: no EGD on file  - HE: now improved with medical therapy   - HCC: triple phase CT (9/30) without lesion     Recommendations:   - Ongoing OLT evaluation   - Dobutamine stress echo today  - Hydroxyzine 25 mg TID PRN pruritis   - Increase cholestyramine to 4 grams TID   - Rifaximin 550 mg BID, lactulose to goal 3-4 BMs per day.   - Low sodium diet  - Thiamine, FA, MV supplementation  - Alcohol cessation counseling  - Appreciate input from Psychiatry    - PT/OT/Nutrition   - Trend CBC, CMP & PT/INR daily    Mayur Castillo MD  Gastroenterology/Hepatology Fellow

## 2024-10-20 DIAGNOSIS — E11.9 TYPE 2 DIABETES MELLITUS WITHOUT COMPLICATION (HCC): ICD-10-CM

## 2024-10-21 RX ORDER — GLIPIZIDE 10 MG/1
10 TABLET, FILM COATED, EXTENDED RELEASE ORAL 2 TIMES DAILY
Qty: 180 TABLET | Refills: 0 | Status: SHIPPED | OUTPATIENT
Start: 2024-10-21

## 2024-10-21 NOTE — TELEPHONE ENCOUNTER
Spoke with patient he is to schedule a appointment online. Did not want to schedule over the phone at this time.

## 2024-10-21 NOTE — TELEPHONE ENCOUNTER
Diabetes Medication Protocol Xqhwmh65/20/2024 05:00 PM   Protocol Details Last A1C < 7.5 and within past 6 months    In person appointment or virtual visit in the past 6 mos or appointment in next 3 mos    Microalbumin procedure in past 12 months or taking ACE/ARB    EGFRCR or GFRNAA > 50    GFR in the past 12 months          Medication(s) to Refill:   Requested Prescriptions     Pending Prescriptions Disp Refills    GLIPIZIDE ER 10 MG Oral Tablet 24 Hr [Pharmacy Med Name: glipiZIDE ER 10 MG Oral Tablet Extended Release 24 Hour] 180 tablet 0     Sig: Take 1 tablet by mouth twice daily         Reason for Medication Refill being sent to Provider / Reason Protocol Failed:  [] 90 day refill has already been granted  [] Blood Pressure out of range  [x] Labs Abnormal/over due  [] Medication not previously prescribed by Provider  [] Non-Protocol Medication  [] Controlled Substance   [] Due for appointment- no future appointment scheduled  [] No Follow up specified      Last Time Medication was Filled:    Medication Quantity Refills Start End   glipiZIDE ER 10 MG Oral Tablet 24 Hr 180 tablet 0 2/26/2024          Last Office Visit with PCP: 6/14/24         Future Appointments:  No future appointments.      Last Blood Pressures:  BP Readings from Last 2 Encounters:   06/14/24 104/78   11/18/22 120/72         Recent Labs:  Lab Results   Component Value Date     (H) 06/14/2024    A1C 8.1 (H) 06/14/2024         Action taken:  [] Refill approved per protocol  [x] Routing to provider for approval

## 2024-11-08 ENCOUNTER — OFFICE VISIT (OUTPATIENT)
Dept: FAMILY MEDICINE CLINIC | Facility: CLINIC | Age: 54
End: 2024-11-08
Payer: COMMERCIAL

## 2024-11-08 VITALS
RESPIRATION RATE: 18 BRPM | SYSTOLIC BLOOD PRESSURE: 122 MMHG | WEIGHT: 201.38 LBS | TEMPERATURE: 97 F | HEART RATE: 89 BPM | BODY MASS INDEX: 27 KG/M2 | OXYGEN SATURATION: 99 % | DIASTOLIC BLOOD PRESSURE: 72 MMHG

## 2024-11-08 DIAGNOSIS — E11.9 TYPE 2 DIABETES MELLITUS WITHOUT COMPLICATION, WITHOUT LONG-TERM CURRENT USE OF INSULIN (HCC): Primary | ICD-10-CM

## 2024-11-08 LAB
CARTRIDGE EXPIRATION DATE: ABNORMAL DATE
HEMOGLOBIN A1C: 7 % (ref 4.3–5.6)

## 2024-11-08 PROCEDURE — 3078F DIAST BP <80 MM HG: CPT | Performed by: FAMILY MEDICINE

## 2024-11-08 PROCEDURE — 83036 HEMOGLOBIN GLYCOSYLATED A1C: CPT | Performed by: FAMILY MEDICINE

## 2024-11-08 PROCEDURE — 3051F HG A1C>EQUAL 7.0%<8.0%: CPT | Performed by: FAMILY MEDICINE

## 2024-11-08 PROCEDURE — 99214 OFFICE O/P EST MOD 30 MIN: CPT | Performed by: FAMILY MEDICINE

## 2024-11-08 PROCEDURE — 3074F SYST BP LT 130 MM HG: CPT | Performed by: FAMILY MEDICINE

## 2024-11-08 NOTE — PROGRESS NOTES
HPI:   Kemar Muller is a 54 year old male who presents for recheck of his diabetes. Patient’s FBS have been not checking recently. Had a low about a month ago, usually when he hasn't eaten anything. Last visit with ophthalmologist was not done.  Pt has been checking his feet on a regular basis. Pt denies any tingling of the feet. Pt denies any issues with depression. Pt complains of nothing new.    Some nausea after ozempic, but not too bad. Overall tolerating it.     Anxiety/irritability: doing well with sertraline 25 mg daily. Would like to continue that.     Some numbness in leg where he had surgery for bypass. Doesn't bother him too much.     Wt Readings from Last 6 Encounters:   11/08/24 201 lb 6.4 oz (91.4 kg)   06/14/24 204 lb (92.5 kg)   11/18/22 205 lb (93 kg)   09/22/22 211 lb (95.7 kg)   08/19/22 205 lb (93 kg)   08/19/22 202 lb (91.6 kg)     Body mass index is 27.31 kg/m².     Lab Results   Component Value Date    A1C 8.1 (H) 06/14/2024    A1C 11.1 (H) 01/12/2024    A1C 11.6 (H) 11/18/2022     Lab Results   Component Value Date    CHOLEST 157 06/14/2024    CHOLEST 169 01/12/2024    CHOLEST 157 09/22/2022     Lab Results   Component Value Date    HDL 35 (L) 06/14/2024    HDL 30 (L) 01/12/2024    HDL 25 (L) 09/22/2022     Lab Results   Component Value Date     (H) 06/14/2024     (H) 01/12/2024     (H) 09/22/2022     Lab Results   Component Value Date    TRIG 106 06/14/2024    TRIG 92 01/12/2024    TRIG 95 09/22/2022     Lab Results   Component Value Date    AST 28 06/14/2024    AST 19 01/12/2024    AST 25 04/07/2022     Lab Results   Component Value Date    ALT 38 06/14/2024    ALT 40 01/12/2024    ALT 58 04/07/2022     Malb/Cre Calc   Date Value Ref Range Status   06/14/2024 3.2 <=30.0 ug/mg Final     Comment:     <30 ug/mg creatinine       Normal     ug/mg creatinine   Microalbuminuria   >300 ug/mg creatinine      Albuminuria       01/12/2024 6.1 <=30.0 ug/mg Final      Comment:     <30 ug/mg creatinine       Normal     ug/mg creatinine   Microalbuminuria   >300 ug/mg creatinine      Albuminuria       04/07/2022 4.4 <=30.0 ug/mg Final     Comment:     <30 ug/mg creatinine       Normal     ug/mg creatinine   Microalbuminuria   >300 ug/mg creatinine      Albuminuria           Current Outpatient Medications   Medication Sig Dispense Refill    GLIPIZIDE ER 10 MG Oral Tablet 24 Hr Take 1 tablet by mouth twice daily 180 tablet 0    FARXIGA 5 MG Oral Tab Take 1 tablet by mouth once daily 90 tablet 0    SERTRALINE 25 MG Oral Tab Take 1 tablet by mouth once daily 30 tablet 0    semaglutide 8 MG/3ML Subcutaneous Solution Pen-injector Inject 2 mg into the skin once a week. 9 each 0    metFORMIN HCl 1000 MG Oral Tab Take 1 tablet (1,000 mg total) by mouth 2 (two) times daily with meals. 180 tablet 0    albuterol 108 (90 Base) MCG/ACT Inhalation Aero Soln Inhale 2 puffs into the lungs every 6 (six) hours as needed for Wheezing. 3 each 2    atorvastatin 80 MG Oral Tab Take 1 tablet (80 mg total) by mouth daily.      Metoprolol Succinate ER 25 MG Oral Tablet 24 Hr TK 1 T PO Q MORNING. SWALLOW WHOLE 90 tablet 0    Losartan Potassium 25 MG Oral Tab TK 1 T PO D  3    aspirin 325 MG Oral Tab Take 1 tablet (325 mg total) by mouth.      tamsulosin 0.4 MG Oral Cap Take 1 capsule (0.4 mg total) by mouth daily. (Patient not taking: Reported on 11/8/2024)        Past Medical History:    Achalasia, esophageal    procedure in 2012    Acute ST elevation myocardial infarction (STEMI) ([facility])    Asthma ([facility])    Chronic back pain greater than 3 months duration    Coronary artery disease involving native coronary artery of native heart without angina pectoris    Diabetes (HCC)    Esophageal reflux    Essential hypertension      Past Surgical History:   Procedure Laterality Date    Other  4/12    achalasia procedure, dilation     [facility]      Social History:   Social  History     Socioeconomic History    Marital status:    Tobacco Use    Smoking status: Never    Smokeless tobacco: Never   Vaping Use    Vaping status: Never Used   Substance and Sexual Activity    Alcohol use: Yes     Comment: occ    Drug use: Yes     Types: Cannabis     Social Drivers of Health      [facility]    Social Connections    Received from [facility], [facility]    Housing Stability     Exercise: minimal.  Diet:  watching some      REVIEW OF SYSTEMS:   GENERAL HEALTH: feels well otherwise  SKIN: denies any unusual skin lesions or rashes  RESPIRATORY: denies shortness of breath    CARDIOVASCULAR: denies chest pain   GI: denies abdominal pain and denies heartburn  NEURO: denies headaches    EXAM:   /72   Pulse 89   Temp 97 °F (36.1 °C) (Temporal)   Resp 18   Wt 201 lb 6.4 oz (91.4 kg)   SpO2 99%   BMI 27.31 kg/m²   GENERAL: well developed, well nourished,in no apparent distress  SKIN: no rashes,no suspicious lesions  NECK: supple,no adenopathy   LUNGS: clear to auscultation  CARDIO: RRR without murmur  GI: good BS's,no masses, HSM or tenderness  EXTREMITIES: no cyanosis, clubbing or edema    ASSESSMENT AND PLAN:   Kemar Muller is a 54 year old male who presents for a recheck of his diabetes. Diabetic control is controlled with A1c of 7.0.   Recommendations are: continue present meds. Continue to watch diet and exercise, refer to Ophthamology, check feet daily.  The patient indicates understanding of these issues and agrees to the plan.  The patient is asked to return in 3-4 months or sooner if needed.    Encounter Diagnosis   Name Primary?    Type 2 diabetes mellitus without complication, without long-term current use of insulin (HCC) Yes   - doing well. Continue current meds.     Orders Placed This Encounter   Procedures    POC Hemoglobin A1C       Meds & Refills for this  Visit:  Requested Prescriptions     Signed Prescriptions Disp Refills    semaglutide 8 MG/3ML Subcutaneous Solution Pen-injector 9 each 0     Sig: Inject 2 mg into the skin once a week.       Imaging & Consults:  None

## 2024-11-10 DIAGNOSIS — F41.1 GAD (GENERALIZED ANXIETY DISORDER): ICD-10-CM

## 2024-11-11 RX ORDER — SERTRALINE HYDROCHLORIDE 25 MG/1
25 TABLET, FILM COATED ORAL DAILY
Qty: 30 TABLET | Refills: 0 | Status: SHIPPED | OUTPATIENT
Start: 2024-11-11

## 2024-11-11 NOTE — TELEPHONE ENCOUNTER
Psychiatric Non-Scheduled (Anti-Anxiety) Rximfx72/10/2024 07:21 AM   Protocol Details In person appointment or virtual visit in the past 6 mos or appointment in next 3 mos    Depression Screening completed within the past 12 months

## 2024-12-17 RX ORDER — ALBUTEROL SULFATE 90 UG/1
2 INHALANT RESPIRATORY (INHALATION) EVERY 6 HOURS PRN
Qty: 3 EACH | Refills: 2 | Status: SHIPPED | OUTPATIENT
Start: 2024-12-17

## 2024-12-17 NOTE — TELEPHONE ENCOUNTER
Patient spouse requests refill    albuterol 108 (90 Base) MCG/ACT Inhalation Aero Soln     [facility] in [place]

## 2025-01-03 DIAGNOSIS — F41.1 GAD (GENERALIZED ANXIETY DISORDER): ICD-10-CM

## 2025-01-03 RX ORDER — SERTRALINE HYDROCHLORIDE 25 MG/1
25 TABLET, FILM COATED ORAL DAILY
Qty: 30 TABLET | Refills: 0 | Status: SHIPPED | OUTPATIENT
Start: 2025-01-03

## 2025-01-08 DIAGNOSIS — E11.65 TYPE 2 DIABETES MELLITUS WITH HYPERGLYCEMIA, WITHOUT LONG-TERM CURRENT USE OF INSULIN (HCC): ICD-10-CM

## 2025-01-08 RX ORDER — DAPAGLIFLOZIN 5 MG/1
5 TABLET, FILM COATED ORAL DAILY
Qty: 90 TABLET | Refills: 0 | Status: SHIPPED | OUTPATIENT
Start: 2025-01-08

## 2025-01-08 NOTE — TELEPHONE ENCOUNTER
Diabetes Medication Protocol Pdxaac2901/08/2025 10:01 AM   Protocol Details Last A1C < 7.5 and within past 6 months    In person appointment or virtual visit in the past 6 mos or appointment in next 3 mos    Microalbumin procedure in past 12 months or taking ACE/ARB    EGFRCR or GFRNAA > 50    GFR in the past 12 months    Medication is active on med list

## 2025-01-13 DIAGNOSIS — F41.1 GAD (GENERALIZED ANXIETY DISORDER): ICD-10-CM

## 2025-01-14 RX ORDER — SERTRALINE HYDROCHLORIDE 25 MG/1
25 TABLET, FILM COATED ORAL DAILY
Qty: 30 TABLET | Refills: 0 | OUTPATIENT
Start: 2025-01-14

## 2025-01-14 NOTE — TELEPHONE ENCOUNTER
Psychiatric Non-Scheduled (Anti-Anxiety) Ludztc7301/13/2025 05:13 PM   Protocol Details In person appointment or virtual visit in the past 6 mos or appointment in next 3 mos    Depression Screening completed within the past 12 months    Medication is active on med list

## 2025-01-15 ENCOUNTER — TELEPHONE (OUTPATIENT)
Dept: FAMILY MEDICINE CLINIC | Facility: CLINIC | Age: 55
End: 2025-01-15

## 2025-01-15 DIAGNOSIS — E11.9 TYPE 2 DIABETES MELLITUS WITHOUT COMPLICATION (HCC): ICD-10-CM

## 2025-01-15 NOTE — TELEPHONE ENCOUNTER
Last OV:11/08/2024  Last refill:1/22/2024 180 tabs, 0 refill   Last labs 06/14/2024  Medication pended, please sign if appropriate

## 2025-01-15 NOTE — TELEPHONE ENCOUNTER
[facility] told spouse that waiting for response from us for refill of:    metFORMIN HCl 1000 MG Oral Tab [[id number]] (Order [id number]     Please send to:  [facility] [address] [place] [address] - [address] [phone], [phone]   [address] [facility] [address]   Phone: [phone] Fax: [phone]   Thank you!

## 2025-02-03 DIAGNOSIS — E11.9 TYPE 2 DIABETES MELLITUS WITHOUT COMPLICATION (HCC): ICD-10-CM

## 2025-02-03 RX ORDER — GLIPIZIDE 10 MG/1
10 TABLET, FILM COATED, EXTENDED RELEASE ORAL 2 TIMES DAILY
Qty: 180 TABLET | Refills: 0 | Status: SHIPPED | OUTPATIENT
Start: 2025-02-03

## 2025-02-03 NOTE — TELEPHONE ENCOUNTER
Diabetes Medication Protocol Fntqpi1002/03/2025 06:21 AM   Protocol Details Last A1C < 7.5 and within past 6 months    In person appointment or virtual visit in the past 6 mos or appointment in next 3 mos    Microalbumin procedure in past 12 months or taking ACE/ARB    EGFRCR or GFRNAA > 50    GFR in the past 12 months    Medication is active on med list

## 2025-02-08 DIAGNOSIS — E11.9 TYPE 2 DIABETES MELLITUS WITHOUT COMPLICATION, WITHOUT LONG-TERM CURRENT USE OF INSULIN (HCC): ICD-10-CM

## 2025-02-08 NOTE — TELEPHONE ENCOUNTER
Diabetes Medication Protocol Sevkzh8402/08/2025 10:36 AM   Protocol Details Last A1C < 7.5 and within past 6 months    In person appointment or virtual visit in the past 6 mos or appointment in next 3 mos    Microalbumin procedure in past 12 months or taking ACE/ARB    EGFRCR or GFRNAA > 50    GFR in the past 12 months    Medication is active on med list

## 2025-02-09 DIAGNOSIS — F41.1 GAD (GENERALIZED ANXIETY DISORDER): ICD-10-CM

## 2025-02-10 ENCOUNTER — TELEPHONE (OUTPATIENT)
Dept: FAMILY MEDICINE CLINIC | Facility: CLINIC | Age: 55
End: 2025-02-10

## 2025-02-10 RX ORDER — SERTRALINE HYDROCHLORIDE 25 MG/1
25 TABLET, FILM COATED ORAL DAILY
Qty: 30 TABLET | Refills: 0 | Status: SHIPPED | OUTPATIENT
Start: 2025-02-10

## 2025-02-10 NOTE — TELEPHONE ENCOUNTER
Epa requested    Detail Level: Zone Retinoid Dermatitis Normal Treatment: I recommended more frequent application of moisturizers. Display Individual Monthly Dosage In The Note (If Yes Will Display All Dosages Which Are Not N/A): yes Cheilitis Normal Treatment: I recommended application of Vaseline or Aquaphor numerous times a day (as often as every hour) and before going to bed. Headache Monitoring: I recommended monitoring the headaches for now. There is no evidence of increased intracranial pressure. They were instructed to call if the headaches are worsening. Myalgia Monitoring: I explained this is common when taking isotretinoin. If this worsens they will contact us. Hypercholesterolemia Monitoring: I explained this is common when taking isotretinoin. We will monitor closely. Retinoid Dermatitis Aggressive Treatment: I recommended more frequent application of Cetaphil or CeraVe to the areas of dermatitis. I also prescribed a topical steroid for twice daily use until the dermatitis resolves. What Is The Patient's Gender: Male Myalgia Treatment: I explained this is common when taking isotretinoin. If this worsens they will contact us. They may try OTC ibuprofen. Cheilitis Aggressive Treatment: I recommended application of Vaseline or Aquaphor numerous times a day (as often as every hour) and before going to bed. I also prescribed a topical steroid for twice daily use. Next Month's Dosage: 30mg BID Completed Therapy?: No Counseling Text: I reviewed the side effect in detail. Patient should get monthly blood tests, not donate blood, not drive at night if vision affected, and not share medication. Female Pregnancy Counseling Text: Female patients should also be on two forms of birth control while taking this medication and for one month after their last dose. Use Therapeutic Ranged Or Therapeutic Target: please select Range or Target Xerosis Normal Treatment: I recommended application of Cetaphil or CeraVe numerous times a day going to bed to all dry areas. Pounds Preamble Statement (Weight Entered In Details Tab): Reported Weight in pounds: Weight Units: pounds Xerosis Aggressive Treatment: I recommended application of Cetaphil or CeraVe numerous times a day going to bed to all dry areas. I also prescribed a topical steroid for twice daily use. Nosebleeds Normal Treatment: I explained this is common when taking isotretinoin. I recommended saline mist in each nostril multiple times a day. If this worsens they will contact us. Kilograms Preamble Statement (Weight Entered In Details Tab): Reported Weight in kilograms: Lower Range (In Mg/Kg): 120 Patient Weight (Optional But Required For Cumulative Dose-Numbers And Decimals Only): 130 Upper Range (In Mg/Kg): 150 Female Completion Statement: After discussing her treatment course we decided to discontinue isotretinoin therapy at this time. I explained that she would need to continue her birth control methods for at least one month after the last dosage. She should also get a pregnancy test one month after the last dose. She shouldn't donate blood for one month after the last dose. She should call with any new symptoms of depression. Retinoid Dermatitis Normal Treatment: I recommended more frequent application of Cetaphil or CeraVe to the areas of dermatitis. Male Completion Statement: After discussing his treatment course we decided to discontinue isotretinoin therapy at this time. He shouldn't donate blood for one month after the last dose. He should call with any new symptoms of depression. Months Of Therapy Completed: 1 Target Cumulative Dosage (In Mg/Kg): 135 Dosing Month 1 (Required For Cumulative Dosing): 40mg Daily Xerosis Normal Treatment: I recommended application of Cetaphil or CeraVe numerous times a day and before going to bed to all dry areas. Xerosis Aggressive Treatment: I recommended application of Cetaphil or CeraVe numerous times a day and before going to bed to all dry areas. I also prescribed a topical steroid for twice daily use.

## 2025-02-10 NOTE — TELEPHONE ENCOUNTER
Spouse notified by pharmacy that a prior auth is required    semaglutide 8 MG/3ML Subcutaneous Solution Pen-injector       [facility] [place]

## 2025-02-10 NOTE — TELEPHONE ENCOUNTER
Psychiatric Non-Scheduled (Anti-Anxiety) Imbomi8302/09/2025 07:23 AM   Protocol Details In person appointment or virtual visit in the past 6 mos or appointment in next 3 mos    Depression Screening completed within the past 12 months    Medication is active on med list

## 2025-02-12 NOTE — TELEPHONE ENCOUNTER
Left message for the pharmacy that the medication was approved  Spoke with the spouse and advised that the medication was approved- she v/u

## 2025-02-12 NOTE — TELEPHONE ENCOUNTER
Kemar Muller (Key: [id number])    form thumbnail  This request has received a Favorable outcome.    Please note any additional information provided by [employer] at the bottom of this request.

## 2025-03-03 ENCOUNTER — TELEPHONE (OUTPATIENT)
Dept: FAMILY MEDICINE CLINIC | Facility: CLINIC | Age: 55
End: 2025-03-03

## 2025-03-03 NOTE — TELEPHONE ENCOUNTER
Spoke with patient wife and advised farxiga is for diabetes and needs to be continued.   Advised 3 month follow up is due anytime. Can schedule to discuss med changes at that time. Wife verbalized understanding. Will discuss with patient and call back to schedule

## 2025-03-03 NOTE — TELEPHONE ENCOUNTER
Patient's spouse calling, states each time patient picks up Farxiga from pharmacy they are charged around $60. Patient's spouse wondering what the medication is for/if it is necessary. Please advise.

## 2025-03-10 DIAGNOSIS — F41.1 GAD (GENERALIZED ANXIETY DISORDER): ICD-10-CM

## 2025-03-10 RX ORDER — SERTRALINE HYDROCHLORIDE 25 MG/1
25 TABLET, FILM COATED ORAL DAILY
Qty: 30 TABLET | Refills: 0 | Status: SHIPPED | OUTPATIENT
Start: 2025-03-10

## 2025-03-10 NOTE — TELEPHONE ENCOUNTER
Psychiatric Non-Scheduled (Anti-Anxiety) Tqqhal19/10/2025 06:23 AM   Protocol Details In person appointment or virtual visit in the past 6 mos or appointment in next 3 mos    Depression Screening completed within the past 12 months    Medication is active on med list

## 2025-04-11 DIAGNOSIS — E11.9 TYPE 2 DIABETES MELLITUS WITHOUT COMPLICATION (HCC): ICD-10-CM

## 2025-04-11 NOTE — TELEPHONE ENCOUNTER
Diabetes Medication Protocol Passed04/11/2025 06:22 AM   Protocol Details Last A1C < 7.5 and within past 6 months    In person appointment or virtual visit in the past 6 mos or appointment in next 3 mos    Microalbumin procedure in past 12 months or taking ACE/ARB    EGFRCR or GFRNAA > 50    GFR in the past 12 months    Medication is active on med list

## 2025-04-12 DIAGNOSIS — F41.1 GAD (GENERALIZED ANXIETY DISORDER): ICD-10-CM

## 2025-04-12 RX ORDER — SERTRALINE HYDROCHLORIDE 25 MG/1
25 TABLET, FILM COATED ORAL DAILY
Qty: 30 TABLET | Refills: 0 | Status: SHIPPED | OUTPATIENT
Start: 2025-04-12

## 2025-04-12 NOTE — TELEPHONE ENCOUNTER
[facility] [address] [place] [address] - [address] [phone], [phone]   [address] [facility] [address]   Phone: [phone] Fax: [phone]   Hours: Not open 24 hours       PATIENT SPOUSE REQUESTING REFILL FOR     SERTRALINE 25 MG Oral Tab 30 tablet 0 3/10/2025 --   Sig:   Take 1 tablet by mouth once daily     Route:   Oral

## 2025-04-12 NOTE — TELEPHONE ENCOUNTER
Psychiatric Non-Scheduled (Anti-Anxiety) Passed04/12/2025 10:54 AM   Protocol Details In person appointment or virtual visit in the past 6 mos or appointment in next 3 mos    Depression Screening completed within the past 12 months    Medication is active on med list

## 2025-05-09 DIAGNOSIS — E11.9 TYPE 2 DIABETES MELLITUS WITHOUT COMPLICATION (HCC): ICD-10-CM

## 2025-05-09 RX ORDER — GLIPIZIDE 10 MG/1
10 TABLET, FILM COATED, EXTENDED RELEASE ORAL 2 TIMES DAILY
Qty: 180 TABLET | Refills: 0 | Status: SHIPPED | OUTPATIENT
Start: 2025-05-09

## 2025-05-09 NOTE — TELEPHONE ENCOUNTER
Diabetes Medication Protocol Npksyq7805/09/2025 08:42 AM   Protocol Details Last A1C < 7.5 and within past 6 months    In person appointment or virtual visit in the past 6 mos or appointment in next 3 mos    Microalbumin procedure in past 12 months or taking ACE/ARB    EGFRCR or GFRNAA > 50    GFR in the past 12 months    Medication is active on med list       Last refill:   edication Quantity Refills Start End   GLIPIZIDE ER 10 MG Oral Tablet 24 Hr 180 tablet 0 2/3/2025      Last Visit: 11/8/24    Next Visit: No future appointments.      Forward to Dr. Dumont please advise on refills. Thanks.

## 2025-06-08 DIAGNOSIS — E11.65 TYPE 2 DIABETES MELLITUS WITH HYPERGLYCEMIA, WITHOUT LONG-TERM CURRENT USE OF INSULIN (HCC): ICD-10-CM

## 2025-06-09 RX ORDER — DAPAGLIFLOZIN 5 MG/1
5 TABLET, FILM COATED ORAL DAILY
Qty: 90 TABLET | Refills: 0 | Status: SHIPPED | OUTPATIENT
Start: 2025-06-09

## 2025-06-09 NOTE — TELEPHONE ENCOUNTER
Diabetes Medication Protocol Zhhqgl3306/08/2025 03:48 PM   Protocol Details Last A1C < 7.5 and within past 6 months    In person appointment or virtual visit in the past 6 mos or appointment in next 3 mos    Microalbumin procedure in past 12 months or taking ACE/ARB    EGFRCR or GFRNAA > 50    GFR in the past 12 months    Medication is active on med list       Last refill:   Medication Quantity Refills Start End   FARXIGA 5 MG Oral Tab 90 tablet 0 1/8/2025      Last Visit: 11/8/24    Next Visit: No future appointments.      Forward to Dr. Dumont please advise on refills. Thanks.

## 2025-07-08 ENCOUNTER — TELEPHONE (OUTPATIENT)
Dept: FAMILY MEDICINE CLINIC | Facility: CLINIC | Age: 55
End: 2025-07-08

## 2025-07-08 DIAGNOSIS — E11.9 TYPE 2 DIABETES MELLITUS WITHOUT COMPLICATION (HCC): ICD-10-CM

## 2025-07-11 NOTE — TELEPHONE ENCOUNTER
For replies, please route to pool: [facility] CENTRAL REFILLS    Please review: medication fails/has no protocol attached.  No future appointments with primary care medicine  Last office visit: 11/8/24    No active/future labs pended for HgA1c: Last completed 11/8/24  No active/future labs pended - last completed - 6/14/24

## 2025-07-12 NOTE — TELEPHONE ENCOUNTER
MALLORIEM notifying patient Dr. Dumont requesting OV prior to next refill. Provided office phone number and requested call back to assist with scheduling.

## 2025-07-14 NOTE — TELEPHONE ENCOUNTER
Please review; protocol failed/ has no protocol    Deepali Basurto2 days ago     LUCAS SYED notifying patient Dr. Dumont requesting OV prior to next refill. Provided office phone number and requested call back to assist with scheduling.

## 2025-07-16 NOTE — TELEPHONE ENCOUNTER
Last read by Kemar Muller at 9:30PM on 7/11/2025.     Patient has read messages recommending he is due for a follow up.     Sent in a temp refill so patient is not out of medications.

## 2025-08-10 DIAGNOSIS — F41.1 GAD (GENERALIZED ANXIETY DISORDER): ICD-10-CM

## 2025-08-10 DIAGNOSIS — E11.9 TYPE 2 DIABETES MELLITUS WITHOUT COMPLICATION (HCC): ICD-10-CM

## 2025-08-12 RX ORDER — SERTRALINE HYDROCHLORIDE 25 MG/1
25 TABLET, FILM COATED ORAL DAILY
Qty: 30 TABLET | Refills: 0 | Status: SHIPPED | OUTPATIENT
Start: 2025-08-12

## (undated) DIAGNOSIS — E11.9 TYPE 2 DIABETES MELLITUS WITHOUT COMPLICATION (HCC): ICD-10-CM

## (undated) DIAGNOSIS — F41.1 GAD (GENERALIZED ANXIETY DISORDER): ICD-10-CM

## (undated) NOTE — LETTER
02/08/20        [address] [address]      Dear Tania Barclay,    [id number] Loc[address] within [facility] records indicate that you have outstanding lab work and or testing that was ordered for you and has not yet been completed:  A1C  To provide you wi

## (undated) NOTE — LETTER
Jase Muller  [address] [facility] [address]    1/15/2019      Dear  Celluba Zamarripa    In order to provide the highest quality care, MICKIE Larson uses a sophisticated computer system to track our p

## (undated) NOTE — LETTER
[address] [address]    1/6/2020      Dear  Shant Russo    In order to provide the highest quality care, MICKIE Swanson uses a sophisticated computer system to track o

## (undated) NOTE — LETTER
Date: 12/14/2018    Patient Name: Harry Rueda          To Whom it may concern: The above patient was seen at the [facility] for treatment of a medical condition on 12/14/18.  Follow up from CABG procedure and LA paperwork submitted

## (undated) NOTE — Clinical Note
ASTHMA ACTION PLAN for Gregg Current     : 3/27/1970     Date: 3/7/2017  Provider:  Patrice Rendon DO  Phone for doctor or clinic: [place] [id number]  [address] [address]  [phone]

## (undated) NOTE — LETTER
[address] [id number]    4/19/2021      Dear  Moise Franco    In order to provide the highest quality care, MICKIE Oseguera uses a sophisticated computer system to track

## (undated) NOTE — Clinical Note
01/12/2017        Mary Muller  [address] [facility] [address]      Dear Papi Seaman records indicate that you have outstanding lab work and or testing that was ordered for you and has not yet been completed. To provide you with the best po

## (undated) NOTE — LETTER
[address] [address]    9/25/2020      Dear  Gentry Olsen    In order to provide the highest quality care, MICKIE Stevens uses a sophisticated computer system to track

## (undated) NOTE — LETTER
Date: 1/10/2019    Patient Name: Rivas Trevino          To Whom it may concern: The above patient was seen at the [facility] for treatment of a medical condition.  His wife, Rico Green, has been his 24/7 caregiver since he had heart

## (undated) NOTE — LETTER
06/21/21          [address]  [facility] [address]           Dear Aleksander Liu records indicate that you have outstanding lab work and or testing that was ordered for you and has not yet been completed:   Juliane Wagner

## (undated) NOTE — LETTER
[address] [facility], [place] [address]  Dept: [phone]  Dept Fax: [phone]         October 8, 2020    Patient: Rula Vanessa   YOB: 1970   Date of Visit: 10/8

## (undated) NOTE — LETTER
09/18/19        [address] [address]      Dear Deana Dickson,    [facility] records indicate that you have outstanding lab work and or testing that was ordered for you and has not yet been completed:  PSA, DIAGNOSTIC; LIPID PANE

## (undated) NOTE — Clinical Note
Had eye exam at [facility] in [facility] in April 2021. Please obtain report.

## (undated) NOTE — LETTER
Patient Name: John Morrell  : 3/27/1970  MRN: [id number]  Patient Address: [address] [place] [address]      Coronavirus Disease 2019 (COVID-19)     [facility] is committed to the safety and well-being of our patient symptoms carefully. If your symptoms get worse, call your healthcare provider immediately. 3. Get rest and stay hydrated. 4. If you have a medical appointment, call the healthcare provider ahead of time and tell them that you have or may have COVID-19. without the use of fever-reducing medications; and  · Improvement in respiratory symptoms (e.g., cough, shortness of breath); and  · At least 10 days have passed since symptoms first appeared OR if asymptomatic patient or date of symptom onset is unclear t convalescent plasma donors must:    · Have had a confirmed diagnosis of COVID-19  · Be symptom-free for at least 14 days*    *Some people will be required to have a repeat COVID-19 test in order to be eligible to donate.  If you’re instructed by Manuel talbot Post-COVID conditions to be random. Researchers are trying to identify similarities between people with a Post-COVID condition to better understand if there are risk factors. How do I prevent a Post-COVID condition?   The best way to prevent the long-t

## (undated) NOTE — LETTER
[facility] [address]  [phone]                  3/29/2018        Breanne Muller  [address] [facility] [address]      Dear Janay Reyes.

## (undated) NOTE — LETTER
08/11/17        Kristi Muller  [address] [facility] [address]      Dear Diana Cleaning records indicate that you have outstanding lab work and or testing that was ordered for you and has not yet been completed:  Lab Frequency Next Occurrence